# Patient Record
Sex: MALE | Race: WHITE | NOT HISPANIC OR LATINO | Employment: FULL TIME | URBAN - METROPOLITAN AREA
[De-identification: names, ages, dates, MRNs, and addresses within clinical notes are randomized per-mention and may not be internally consistent; named-entity substitution may affect disease eponyms.]

---

## 2024-07-30 ENCOUNTER — OFFICE VISIT (OUTPATIENT)
Dept: FAMILY MEDICINE CLINIC | Facility: CLINIC | Age: 34
End: 2024-07-30
Payer: COMMERCIAL

## 2024-07-30 VITALS
HEART RATE: 85 BPM | HEIGHT: 73 IN | WEIGHT: 315 LBS | OXYGEN SATURATION: 95 % | SYSTOLIC BLOOD PRESSURE: 132 MMHG | BODY MASS INDEX: 41.75 KG/M2 | DIASTOLIC BLOOD PRESSURE: 98 MMHG

## 2024-07-30 DIAGNOSIS — Z00.00 HEALTH CARE MAINTENANCE: ICD-10-CM

## 2024-07-30 DIAGNOSIS — E78.2 MIXED HYPERLIPIDEMIA: Chronic | ICD-10-CM

## 2024-07-30 DIAGNOSIS — Z00.00 ENCOUNTER FOR ANNUAL PHYSICAL EXAM: Primary | ICD-10-CM

## 2024-07-30 DIAGNOSIS — I10 ESSENTIAL HYPERTENSION: Chronic | ICD-10-CM

## 2024-07-30 DIAGNOSIS — E66.01 MORBID (SEVERE) OBESITY DUE TO EXCESS CALORIES: Chronic | ICD-10-CM

## 2024-07-30 DIAGNOSIS — F51.05 INSOMNIA DUE TO OTHER MENTAL DISORDER: Chronic | ICD-10-CM

## 2024-07-30 DIAGNOSIS — F99 INSOMNIA DUE TO OTHER MENTAL DISORDER: Chronic | ICD-10-CM

## 2024-07-30 DIAGNOSIS — F41.0 SEVERE ANXIETY WITH PANIC: Chronic | ICD-10-CM

## 2024-07-30 DIAGNOSIS — K21.9 CHRONIC GERD: Chronic | ICD-10-CM

## 2024-07-30 LAB
ALBUMIN SERPL-MCNC: 4.7 G/DL (ref 3.5–5.2)
ALBUMIN/GLOB SERPL: 2 G/DL
ALP SERPL-CCNC: 57 U/L (ref 39–117)
ALT SERPL-CCNC: 16 U/L (ref 1–41)
AST SERPL-CCNC: 17 U/L (ref 1–40)
BILIRUB SERPL-MCNC: 0.8 MG/DL (ref 0–1.2)
BUN SERPL-MCNC: 12 MG/DL (ref 6–20)
BUN/CREAT SERPL: 12.8 (ref 7–25)
CALCIUM SERPL-MCNC: 9.6 MG/DL (ref 8.6–10.5)
CHLORIDE SERPL-SCNC: 102 MMOL/L (ref 98–107)
CHOLEST SERPL-MCNC: 198 MG/DL (ref 0–200)
CO2 SERPL-SCNC: 25.7 MMOL/L (ref 22–29)
CREAT SERPL-MCNC: 0.94 MG/DL (ref 0.76–1.27)
EGFRCR SERPLBLD CKD-EPI 2021: 108.4 ML/MIN/1.73
GLOBULIN SER CALC-MCNC: 2.4 GM/DL
GLUCOSE SERPL-MCNC: 96 MG/DL (ref 65–99)
HDLC SERPL-MCNC: 43 MG/DL (ref 40–60)
LDLC SERPL CALC-MCNC: 132 MG/DL (ref 0–100)
POTASSIUM SERPL-SCNC: 4.6 MMOL/L (ref 3.5–5.2)
PROT SERPL-MCNC: 7.1 G/DL (ref 6–8.5)
SODIUM SERPL-SCNC: 137 MMOL/L (ref 136–145)
TRIGL SERPL-MCNC: 125 MG/DL (ref 0–150)
VLDLC SERPL CALC-MCNC: 23 MG/DL (ref 5–40)

## 2024-07-30 PROCEDURE — 99395 PREV VISIT EST AGE 18-39: CPT | Performed by: NURSE PRACTITIONER

## 2024-07-30 PROCEDURE — 99214 OFFICE O/P EST MOD 30 MIN: CPT | Performed by: NURSE PRACTITIONER

## 2024-07-30 NOTE — PROGRESS NOTES
Chief Complaint  Annual checkup.     HISTORY    Kenny Man Jr. is a 33 y.o. male who presents to the office today as a  an established patient for their annual preventative exam.      No hospitalization(s) within the last year.     Current exercise regimen: None.    Status of chronic medical conditions:     Hypertension - uncontrolled on Lisinopril 40mg daily and sometimes will occasional miss a dose here and there.  He denies SOB, CP, heart palpitations and syncope.     Hyperlipidemia -  uncontrolled on prior lab from 4/30/24.  Currently taking Rosuvastatin 40mg nightly. He may have only missed taking medication a couple days since 4/2024.  He denies muscle aches.     GERD - stable on OTC Nexium for chronic reflux.  He reports Nexium helps with reflux.  He is followed by GI who encouraged taking Nexium at night due to increased nighttime symptoms.  GI did EGD with finding of acute duodenitis.     Severe Anxiety and Panic Attacks - controlled on Buspar 10mg TID and Escitalopram 10mg daily.  He denies SI/HI.     Insomnia - controlled on Trazodone to help him with anxiety and sleep.     Any other concerns regarding their health today: None.    Review of Systems   Constitutional: Negative.    HENT: Negative.     Eyes: Negative.    Respiratory: Negative.     Cardiovascular: Negative.    Gastrointestinal: Negative.    Endocrine: Negative.    Genitourinary: Negative.    Musculoskeletal: Negative.         Intermittent right ankle pain - hx of fracture and sprain - controlled with OTC medication.   Skin: Negative.    Allergic/Immunologic: Negative.    Neurological: Negative.    Hematological: Negative.    Psychiatric/Behavioral:  Positive for sleep disturbance and depressed mood. Negative for agitation, behavioral problems, decreased concentration, dysphoric mood, hallucinations, self-injury, suicidal ideas, negative for hyperactivity and stress. The patient is nervous/anxious.         Anxiety/painic attacks and  depression controlled with medication; sleep controlled with medication.        Health Maintenance Summary            Overdue - COVID-19 Vaccine (5 - 2023-24 season) Overdue since 9/1/2023 11/06/2021  Imm Admin: COVID-19 (MODERNA) Monovalent Original Booster    02/19/2021  Imm Admin: COVID-19 (MODERNA) 1st,2nd,3rd Dose Monovalent    01/25/2021  Imm Admin: COVID-19 (MODERNA) 1st,2nd,3rd Dose Monovalent    01/06/2021  Imm Admin: COVID-19 (MODERNA) 1st,2nd,3rd Dose Monovalent              Overdue - HEPATITIS C SCREENING (Once) Never done      No completion history exists for this topic.              Overdue - ANNUAL PHYSICAL (Yearly) Never done      No completion history exists for this topic.              INFLUENZA VACCINE (Yearly - August to March) Due since 8/1/2024      No completion history exists for this topic.              BMI FOLLOWUP (Yearly) Next due on 4/30/2025 04/30/2024  [employer]: WORKFLOW - QUALITY MEASUREMENT - DOCUMENTED WEIGHT FOLLOW-UP PLAN    04/30/2024  [employer]: WORKFLOW - QUALITY MEASUREMENT - BMI FOLLOW UP CARE PLAN DOCUMENTED              LIPID PANEL (Yearly) Next due on 7/30/2025 07/30/2024  Lipid Panel    04/30/2024  Lipid Panel    12/14/2022  LIPID PANEL    05/05/2022  LIPID PANEL    11/05/2021  LIPID PANEL    Only the first 5 history entries have been loaded, but more history exists.              TDAP/TD VACCINES (2 - Td or Tdap) Next due on 8/4/2031 08/04/2021  Outside Immunization: Tdap, Adsorbed              Pneumococcal Vaccine 0-64 (Series Information) Aged Out      No completion history exists for this topic.                     Allergies   Allergen Reactions    Nickel Rash        Outpatient Medications Marked as Taking for the 7/30/24 encounter (Office Visit) with Rosio Rondon APRN   Medication Sig Dispense Refill    esomeprazole (nexIUM) 40 MG capsule Take 1 capsule by mouth Every Morning Before Breakfast. 30 capsule 12    traZODone (DESYREL) 100 MG  "tablet Take 0.5-1 tablets by mouth Every Night. 60 tablet 0    [DISCONTINUED] busPIRone (BUSPAR) 10 MG tablet Take 1 tablet by mouth 3 (Three) Times a Day. 270 tablet 0    [DISCONTINUED] escitalopram (LEXAPRO) 10 MG tablet Take 1 tablet by mouth Daily. 90 tablet 0    [DISCONTINUED] lisinopril (PRINIVIL,ZESTRIL) 40 MG tablet Take 1 tablet by mouth Daily. 90 tablet 0    [DISCONTINUED] rosuvastatin (CRESTOR) 40 MG tablet Take 1 tablet by mouth Every Night. 90 tablet 0       Past Medical History:   Diagnosis Date    Anxiety     Hyperlipidemia     Hypertension     Obesity      Past Surgical History:   Procedure Laterality Date    FINGER FRACTURE SURGERY  2019    pinky    FRACTURE SURGERY  2022    Broke and dislocated by right ankle. Complete facture of the fibula and complete dislocation of the tibia.     Family History   Problem Relation Age of Onset    Anxiety disorder Mother     Heart disease Father     Heart disease Paternal Grandfather          of heart disease at age 49    Anxiety disorder Sister     reports that he quit smoking about 2 years ago. His smoking use included cigarettes. He started smoking about 15 years ago. He has a 6.5 pack-year smoking history. He has been exposed to tobacco smoke. He has never used smokeless tobacco. He reports current alcohol use of about 5.0 standard drinks of alcohol per week. He reports that he does not use drugs.    Immunization History   Administered Date(s) Administered    COVID-19 (MODERNA) 1st,2nd,3rd Dose Monovalent 2021, 2021, 2021    COVID-19 (MODERNA) Monovalent Original Booster 2021        OBJECTIVE    Vital Signs:   /98 (BP Location: Right arm, Patient Position: Sitting, Cuff Size: Adult)   Pulse 85   Ht 184.4 cm (72.6\")   Wt (!) 155 kg (341 lb 6.4 oz)   SpO2 95%   BMI 45.54 kg/m²     Physical Exam  Vitals and nursing note reviewed.   Constitutional:       General: He is not in acute distress.     Appearance: Normal " appearance. He is obese. He is not ill-appearing.   HENT:      Head: Normocephalic and atraumatic.      Right Ear: Tympanic membrane, ear canal and external ear normal. There is no impacted cerumen.      Left Ear: Tympanic membrane, ear canal and external ear normal. There is no impacted cerumen.      Nose: Nose normal. No congestion or rhinorrhea.      Mouth/Throat:      Mouth: Mucous membranes are moist.      Pharynx: No oropharyngeal exudate or posterior oropharyngeal erythema.   Eyes:      General: No scleral icterus.        Right eye: No discharge.         Left eye: No discharge.      Extraocular Movements: Extraocular movements intact.      Conjunctiva/sclera: Conjunctivae normal.      Pupils: Pupils are equal, round, and reactive to light.   Neck:      Vascular: No carotid bruit.   Cardiovascular:      Rate and Rhythm: Normal rate and regular rhythm.      Pulses: Normal pulses.      Heart sounds: Normal heart sounds. No murmur heard.     No gallop.   Pulmonary:      Effort: Pulmonary effort is normal. No respiratory distress.      Breath sounds: Normal breath sounds. No wheezing.   Abdominal:      General: Bowel sounds are normal. There is no distension.      Palpations: Abdomen is soft. There is no mass.      Tenderness: There is no abdominal tenderness. There is no right CVA tenderness, left CVA tenderness, guarding or rebound.      Hernia: There is no hernia in the umbilical area or ventral area.   Genitourinary:     Comments: Deferred.  Musculoskeletal:         General: No swelling or tenderness. Normal range of motion.      Cervical back: Normal range of motion and neck supple. No rigidity or tenderness.      Right lower leg: No edema.      Left lower leg: No edema.   Lymphadenopathy:      Cervical: No cervical adenopathy.   Skin:     General: Skin is warm.      Capillary Refill: Capillary refill takes less than 2 seconds.      Findings: No lesion or rash.   Neurological:      General: No focal deficit  "present.      Mental Status: He is alert.      Cranial Nerves: No cranial nerve deficit.      Sensory: No sensory deficit.      Motor: No weakness.      Coordination: Coordination normal.      Gait: Gait normal.   Psychiatric:         Mood and Affect: Mood normal.         Behavior: Behavior normal.          The following data was reviewed by: STEPH Grewal on 07/30/2024:    CBC & Differential (04/30/2024 14:28)  Comprehensive Metabolic Panel (04/30/2024 14:28)  Lipid Panel (04/30/2024 14:28)  Hemoglobin A1c (04/30/2024 14:28)         The ASCVD Risk score (Iker WHYTE, et al., 2019) failed to calculate for the following reasons:    The 2019 ASCVD risk score is only valid for ages 40 to 79           ASSESSMENT & PLAN   Diagnoses and all orders for this visit:    1. Encounter for annual physical exam (Primary)  -     Comprehensive Metabolic Panel  -     Lipid Panel    2. Essential hypertension  Assessment & Plan:  Hypertension is uncontrolled.  Patient \"will occasionally miss a dose here and there.\"  Decrease table salt and foods high in salt.  Weight loss.  Increase activity daily.  Continue Lisinopril 40mg daily.  Discussed importance of taking BP med daily every day.  Blood pressure will be re-assessed in 6 months.      Orders:  -     Comprehensive Metabolic Panel    3. Mixed hyperlipidemia  Assessment & Plan:  Lipid abnormalities are uncontrolled on prior lab.  Patient missed dose a couple days since 4/2024.  Encouraged lifestyle changes.  Continue current treatment plan.  Will re-assess lipids today.      Orders:  -     Lipid Panel    4. Severe anxiety with panic  -     Comprehensive Metabolic Panel    5. Chronic GERD  -     Comprehensive Metabolic Panel    6. Insomnia due to other mental disorder    7. Morbid (severe) obesity due to excess calories  -     Comprehensive Metabolic Panel  -     Lipid Panel    8. Health care maintenance  Comments:  Use sunscreen w/sun expsure.  Working smoke/Carbon Monoxide " detectors in home.  Wear seatbelt.  Annual Vision/Dental exam.  Increase activity 30min/day x 5 days        Annual Preventative Health Examination  -Age and sex appropriate physical exam performed and documented. Updated past medical, family, social and surgical histories as well as allergies and care team list. Addressed care gaps listed in the medical record.  -Encouraged seat belt use for every car ride for patient and all occupants. Encouraged sunscreen use to reduce risk of skin cancer for any days with sun exposure over 20 minutes.  Discussed the importance of smoke and carbon monoxide detectors in the home.   -Encouraged annual dental and vision exams as part of their overall health.  -Encouraged minimum of 30 minutes or more of exercise at a brisk walk or higher 5 days per week combined with a well-balanced diet.   -Immunizations reviewed and updated in EMR. COVID19 recommended.  -Lipid screening:   Lipid Panel          4/30/2024    14:28 7/30/2024    10:00   Lipid Panel   Total Cholesterol 362  198    Triglycerides 140  125    HDL Cholesterol 47  43    VLDL Cholesterol 27  23    LDL Cholesterol  288  132    Patient is less than age 40 and has had a screening lipid panel in the last 4 years that was abnormal.  Will recollect Lipid Panel today.  -Aspirin for primary or secondary prevention: Not applicable, patient is less than age 50.  -Depression and Anxiety screening: Patient has known diagnosis of depression and / or anxiety.  -Diabetes screening: Patient is 35-70 years of age and overweight, screening for diabetes is indicated every 3 years. Screening is up to date.    -Tobacco use screening: Patient denies cigarette use. Tobacco counseling was was not indicated.  -Alcohol use screening: Patient reports drinking 5 drinks per week.. Alcohol abuse counseling was was declined by patient.  -Illicit drug screening: Patient does not use illicit drugs.  -Abdominal aortic aneurysm screening: AAA screening is not  indicated as patient is less than 65 years of age.  -Hypertension screening: Patient with known diagnosis of hypertension and is receiving treatment.  -HIV screening: Screening not indicated, not in a high-risk group.  -Hepatitis C virus screening:  Screening not indicated, not in a high-risk group.  -Syphilis screening: Syphilis screening not indicated.  -Hepatitis B virus screening: Screening not indicated, not in a high-risk group.  -Colon cancer screening: Patient is less than age 45 and colon cancer screening is not indicated.  -Lung cancer screening: Patient has smoked but does not meet other eligibility criteria for screening.  -Prostate cancer screening: Not applicable, patient is less than 50 years old.      Follow up in 1 year for annual physical exam.    Patient/family had no further questions at this time and verbalized understanding of the plan discussed today.     This document has been electronically signed by STEPH Grewal  August 15, 2024 22:23 EDT

## 2024-07-31 DIAGNOSIS — F41.0 SEVERE ANXIETY WITH PANIC: ICD-10-CM

## 2024-07-31 DIAGNOSIS — E78.2 MIXED HYPERLIPIDEMIA: ICD-10-CM

## 2024-07-31 DIAGNOSIS — I10 ESSENTIAL HYPERTENSION: ICD-10-CM

## 2024-07-31 RX ORDER — ESCITALOPRAM OXALATE 10 MG/1
10 TABLET ORAL DAILY
Qty: 90 TABLET | Refills: 0 | Status: SHIPPED | OUTPATIENT
Start: 2024-07-31

## 2024-07-31 RX ORDER — ROSUVASTATIN CALCIUM 40 MG/1
40 TABLET, COATED ORAL
Qty: 90 TABLET | Refills: 0 | Status: SHIPPED | OUTPATIENT
Start: 2024-07-31

## 2024-07-31 RX ORDER — BUSPIRONE HYDROCHLORIDE 10 MG/1
10 TABLET ORAL 3 TIMES DAILY
Qty: 270 TABLET | Refills: 0 | Status: SHIPPED | OUTPATIENT
Start: 2024-07-31

## 2024-07-31 RX ORDER — LISINOPRIL 40 MG/1
40 TABLET ORAL DAILY
Qty: 90 TABLET | Refills: 0 | Status: SHIPPED | OUTPATIENT
Start: 2024-07-31

## 2024-08-12 ENCOUNTER — HOSPITAL ENCOUNTER (EMERGENCY)
Facility: HOSPITAL | Age: 34
Discharge: HOME | End: 2024-08-12
Attending: EMERGENCY MEDICINE | Admitting: EMERGENCY MEDICINE
Payer: COMMERCIAL

## 2024-08-12 ENCOUNTER — APPOINTMENT (OUTPATIENT)
Dept: GENERAL RADIOLOGY | Facility: HOSPITAL | Age: 34
End: 2024-08-12
Payer: COMMERCIAL

## 2024-08-12 VITALS
OXYGEN SATURATION: 96 % | SYSTOLIC BLOOD PRESSURE: 129 MMHG | TEMPERATURE: 98.8 F | RESPIRATION RATE: 16 BRPM | HEART RATE: 110 BPM | DIASTOLIC BLOOD PRESSURE: 81 MMHG

## 2024-08-12 DIAGNOSIS — S29.019A ACUTE THORACIC MYOFASCIAL STRAIN, INITIAL ENCOUNTER: Primary | ICD-10-CM

## 2024-08-12 LAB
ALBUMIN SERPL-MCNC: 4.2 G/DL (ref 3.5–5.2)
ALBUMIN/GLOB SERPL: 2 G/DL
ALP SERPL-CCNC: 54 U/L (ref 39–117)
ALT SERPL W P-5'-P-CCNC: 11 U/L (ref 1–41)
ANION GAP SERPL CALCULATED.3IONS-SCNC: 8.5 MMOL/L (ref 5–15)
AST SERPL-CCNC: 11 U/L (ref 1–40)
BASOPHILS # BLD AUTO: 0.03 10*3/MM3 (ref 0–0.2)
BASOPHILS NFR BLD AUTO: 0.5 % (ref 0–1.5)
BILIRUB SERPL-MCNC: 0.7 MG/DL (ref 0–1.2)
BUN SERPL-MCNC: 11 MG/DL (ref 6–20)
BUN/CREAT SERPL: 14.5 (ref 7–25)
CALCIUM SPEC-SCNC: 9 MG/DL (ref 8.6–10.5)
CHLORIDE SERPL-SCNC: 105 MMOL/L (ref 98–107)
CO2 SERPL-SCNC: 23.5 MMOL/L (ref 22–29)
CREAT SERPL-MCNC: 0.76 MG/DL (ref 0.76–1.27)
DEPRECATED RDW RBC AUTO: 40.6 FL (ref 37–54)
EGFRCR SERPLBLD CKD-EPI 2021: 120.2 ML/MIN/1.73
EOSINOPHIL # BLD AUTO: 0.18 10*3/MM3 (ref 0–0.4)
EOSINOPHIL NFR BLD AUTO: 3 % (ref 0.3–6.2)
ERYTHROCYTE [DISTWIDTH] IN BLOOD BY AUTOMATED COUNT: 11.7 % (ref 12.3–15.4)
GLOBULIN UR ELPH-MCNC: 2.1 GM/DL
GLUCOSE SERPL-MCNC: 128 MG/DL (ref 65–99)
HCT VFR BLD AUTO: 40.2 % (ref 37.5–51)
HGB BLD-MCNC: 13.8 G/DL (ref 13–17.7)
HOLD SPECIMEN: NORMAL
HOLD SPECIMEN: NORMAL
IMM GRANULOCYTES # BLD AUTO: 0.02 10*3/MM3 (ref 0–0.05)
IMM GRANULOCYTES NFR BLD AUTO: 0.3 % (ref 0–0.5)
LYMPHOCYTES # BLD AUTO: 1.63 10*3/MM3 (ref 0.7–3.1)
LYMPHOCYTES NFR BLD AUTO: 27.2 % (ref 19.6–45.3)
MCH RBC QN AUTO: 32.7 PG (ref 26.6–33)
MCHC RBC AUTO-ENTMCNC: 34.3 G/DL (ref 31.5–35.7)
MCV RBC AUTO: 95.3 FL (ref 79–97)
MONOCYTES # BLD AUTO: 0.37 10*3/MM3 (ref 0.1–0.9)
MONOCYTES NFR BLD AUTO: 6.2 % (ref 5–12)
NEUTROPHILS NFR BLD AUTO: 3.77 10*3/MM3 (ref 1.7–7)
NEUTROPHILS NFR BLD AUTO: 62.8 % (ref 42.7–76)
NRBC BLD AUTO-RTO: 0 /100 WBC (ref 0–0.2)
PLATELET # BLD AUTO: 196 10*3/MM3 (ref 140–450)
PMV BLD AUTO: 10.5 FL (ref 6–12)
POTASSIUM SERPL-SCNC: 4.2 MMOL/L (ref 3.5–5.2)
PROT SERPL-MCNC: 6.3 G/DL (ref 6–8.5)
QT INTERVAL: 355 MS
QTC INTERVAL: 412 MS
RBC # BLD AUTO: 4.22 10*6/MM3 (ref 4.14–5.8)
SODIUM SERPL-SCNC: 137 MMOL/L (ref 136–145)
TROPONIN T SERPL HS-MCNC: <6 NG/L
WBC NRBC COR # BLD AUTO: 6 10*3/MM3 (ref 3.4–10.8)
WHOLE BLOOD HOLD COAG: NORMAL
WHOLE BLOOD HOLD SPECIMEN: NORMAL

## 2024-08-12 PROCEDURE — 93005 ELECTROCARDIOGRAM TRACING: CPT | Performed by: EMERGENCY MEDICINE

## 2024-08-12 PROCEDURE — 93010 ELECTROCARDIOGRAM REPORT: CPT | Performed by: INTERNAL MEDICINE

## 2024-08-12 PROCEDURE — 93005 ELECTROCARDIOGRAM TRACING: CPT

## 2024-08-12 PROCEDURE — 84484 ASSAY OF TROPONIN QUANT: CPT

## 2024-08-12 PROCEDURE — 99284 EMERGENCY DEPT VISIT MOD MDM: CPT

## 2024-08-12 PROCEDURE — 85025 COMPLETE CBC W/AUTO DIFF WBC: CPT

## 2024-08-12 PROCEDURE — 36415 COLL VENOUS BLD VENIPUNCTURE: CPT

## 2024-08-12 PROCEDURE — 80053 COMPREHEN METABOLIC PANEL: CPT

## 2024-08-12 PROCEDURE — 71045 X-RAY EXAM CHEST 1 VIEW: CPT

## 2024-08-12 RX ORDER — ASPIRIN 325 MG
325 TABLET ORAL ONCE
Status: DISCONTINUED | OUTPATIENT
Start: 2024-08-12 | End: 2024-08-13 | Stop reason: HOSPADM

## 2024-08-12 RX ORDER — HYDROCODONE BITARTRATE AND ACETAMINOPHEN 7.5; 325 MG/1; MG/1
1 TABLET ORAL ONCE
Status: COMPLETED | OUTPATIENT
Start: 2024-08-12 | End: 2024-08-12

## 2024-08-12 RX ORDER — SODIUM CHLORIDE 0.9 % (FLUSH) 0.9 %
10 SYRINGE (ML) INJECTION AS NEEDED
Status: DISCONTINUED | OUTPATIENT
Start: 2024-08-12 | End: 2024-08-13 | Stop reason: HOSPADM

## 2024-08-12 RX ORDER — METHOCARBAMOL 750 MG/1
1500 TABLET, FILM COATED ORAL ONCE
Status: COMPLETED | OUTPATIENT
Start: 2024-08-12 | End: 2024-08-12

## 2024-08-12 RX ORDER — HYDROCODONE BITARTRATE AND ACETAMINOPHEN 5; 325 MG/1; MG/1
1 TABLET ORAL EVERY 6 HOURS PRN
Qty: 12 TABLET | Refills: 0 | Status: SHIPPED | OUTPATIENT
Start: 2024-08-12

## 2024-08-12 RX ORDER — METHOCARBAMOL 500 MG/1
1500 TABLET, FILM COATED ORAL 3 TIMES DAILY
Qty: 27 TABLET | Refills: 0 | Status: SHIPPED | OUTPATIENT
Start: 2024-08-12

## 2024-08-12 RX ADMIN — METHOCARBAMOL TABLETS 1500 MG: 750 TABLET, COATED ORAL at 20:14

## 2024-08-12 RX ADMIN — HYDROCODONE BITARTRATE AND ACETAMINOPHEN 1 TABLET: 7.5; 325 TABLET ORAL at 20:13

## 2024-08-12 NOTE — ED PROVIDER NOTES
" EMERGENCY DEPARTMENT ENCOUNTER    Room Number:  HB2/D  PCP: Rosio Rondon APRN  Historian: Patient    I initially evaluated the patient at 1954    HPI:  Chief Complaint: Left upper back pain  A complete HPI/ROS/PMH/PSH/SH/FH are unobtainable due to: Nothing  Context: Kenny Man Jr. is a 33 y.o. male with a medical history of hypertension, hyperlipidemia, morbid obesity who presents to the ED c/o acute left upper back pain.  Patient noticed some pain in his left upper back when he woke up yesterday morning.  He says it \"felt like there was a knot there\".  When he woke up this morning, pain was somewhat worse and was radiating into his left neck, left shoulder, and left upper arm.  He reports intermittent tingling in his left upper arm.  Pain is worse with movement.  Denies headache, chest pain, shortness of breath, nausea, vomiting, or sweating.  He took a muscle relaxer with minimal relief.  He went to urgent care earlier today and was sent to the ED for further evaluation.  He denies history of neck or back problems or previous spine surgery.  He works as a  but does not recall any recent injury.            PAST MEDICAL HISTORY  Active Ambulatory Problems     Diagnosis Date Noted    Severe anxiety with panic 07/07/2021    Vitamin D deficiency 08/04/2021    Mixed hyperlipidemia 07/21/2021    Chronic GERD 07/07/2021    Essential hypertension 07/07/2021    Family history of diabetes mellitus in sister 04/30/2024    BMI 40.0-44.9, adult 04/30/2024    Insomnia due to other mental disorder 05/04/2024     Resolved Ambulatory Problems     Diagnosis Date Noted    Generalized anxiety disorder with panic attacks 04/30/2024     Past Medical History:   Diagnosis Date    Anxiety 07/21    Hyperlipidemia     Hypertension     Obesity          PAST SURGICAL HISTORY  Past Surgical History:   Procedure Laterality Date    FINGER FRACTURE SURGERY  2019    pinky    FRACTURE SURGERY  01/28/2022    Broke and " dislocated by right ankle. Complete facture of the fibula and complete dislocation of the tibia.         FAMILY HISTORY  Family History   Problem Relation Age of Onset    Anxiety disorder Mother     Heart disease Father     Heart disease Paternal Grandfather          of heart disease at age 49    Anxiety disorder Sister          SOCIAL HISTORY  Social History     Socioeconomic History    Marital status: Single   Tobacco Use    Smoking status: Former     Current packs/day: 0.00     Average packs/day: 0.5 packs/day for 13.0 years (6.5 ttl pk-yrs)     Types: Cigarettes     Start date: 2009     Quit date: 2022     Years since quittin.1     Passive exposure: Past    Smokeless tobacco: Never    Tobacco comments:     I was a smoker. Never got above half a pack per day. I use tobacco free nicotine vapes.   Vaping Use    Vaping status: Every Day    Substances: Nicotine, Flavoring    Devices: Disposable   Substance and Sexual Activity    Alcohol use: Yes     Alcohol/week: 5.0 standard drinks of alcohol     Types: 5 Standard drinks or equivalent per week    Drug use: Never    Sexual activity: Not Currently     Partners: Female     Birth control/protection: Condom         ALLERGIES  Nickel    REVIEW OF SYSTEMS  Review of Systems  Included in HPI  All systems reviewed and negative except for those discussed in HPI.      PHYSICAL EXAM  ED Triage Vitals   Temp Heart Rate Resp BP SpO2   24 1650 24 1650 24 1650 24 1659 24 1650   98.8 °F (37.1 °C) 110 16 129/81 96 %      Temp src Heart Rate Source Patient Position BP Location FiO2 (%)   -- -- 24 1659 24 1659 --     Sitting Right arm        Physical Exam      GENERAL: Awake, alert, oriented x 3.  Well-developed male.  Resting comfortably in no acute distress.  BMI 45.5  HENT: NCAT, nares patent  EYES: no scleral icterus  CV: regular rhythm, normal rate, equal bilateral radial pulses  RESPIRATORY: normal effort, clear to  auscultation bilaterally  ABDOMEN: soft, nontender  MUSCULOSKELETAL: C/T/L-spine are nontender.  There is soft tissue tenderness over the left upper thoracic paraspinal muscles and left trapezius muscle.  Extremities are nontender with full range of motion  NEURO: Speech is normal.  No facial droop.  Normal strength and light touch sensation in all extremities  PSYCH:  calm, cooperative  SKIN: warm, dry    Vital signs and nursing notes reviewed.          LAB RESULTS  Recent Results (from the past 24 hour(s))   ECG 12 Lead ED Triage Standing Order; Chest Pain    Collection Time: 08/12/24  4:52 PM   Result Value Ref Range    QT Interval 355 ms    QTC Interval 412 ms   Comprehensive Metabolic Panel    Collection Time: 08/12/24  4:59 PM    Specimen: Blood   Result Value Ref Range    Glucose 128 (H) 65 - 99 mg/dL    BUN 11 6 - 20 mg/dL    Creatinine 0.76 0.76 - 1.27 mg/dL    Sodium 137 136 - 145 mmol/L    Potassium 4.2 3.5 - 5.2 mmol/L    Chloride 105 98 - 107 mmol/L    CO2 23.5 22.0 - 29.0 mmol/L    Calcium 9.0 8.6 - 10.5 mg/dL    Total Protein 6.3 6.0 - 8.5 g/dL    Albumin 4.2 3.5 - 5.2 g/dL    ALT (SGPT) 11 1 - 41 U/L    AST (SGOT) 11 1 - 40 U/L    Alkaline Phosphatase 54 39 - 117 U/L    Total Bilirubin 0.7 0.0 - 1.2 mg/dL    Globulin 2.1 gm/dL    A/G Ratio 2.0 g/dL    BUN/Creatinine Ratio 14.5 7.0 - 25.0    Anion Gap 8.5 5.0 - 15.0 mmol/L    eGFR 120.2 >60.0 mL/min/1.73   High Sensitivity Troponin T    Collection Time: 08/12/24  4:59 PM    Specimen: Blood   Result Value Ref Range    HS Troponin T <6 <22 ng/L   Green Top (Gel)    Collection Time: 08/12/24  4:59 PM   Result Value Ref Range    Extra Tube Hold for add-ons.    Lavender Top    Collection Time: 08/12/24  4:59 PM   Result Value Ref Range    Extra Tube hold for add-on    Gold Top - SST    Collection Time: 08/12/24  4:59 PM   Result Value Ref Range    Extra Tube Hold for add-ons.    Light Blue Top    Collection Time: 08/12/24  4:59 PM   Result Value Ref Range     Extra Tube Hold for add-ons.    CBC Auto Differential    Collection Time: 08/12/24  4:59 PM    Specimen: Blood   Result Value Ref Range    WBC 6.00 3.40 - 10.80 10*3/mm3    RBC 4.22 4.14 - 5.80 10*6/mm3    Hemoglobin 13.8 13.0 - 17.7 g/dL    Hematocrit 40.2 37.5 - 51.0 %    MCV 95.3 79.0 - 97.0 fL    MCH 32.7 26.6 - 33.0 pg    MCHC 34.3 31.5 - 35.7 g/dL    RDW 11.7 (L) 12.3 - 15.4 %    RDW-SD 40.6 37.0 - 54.0 fl    MPV 10.5 6.0 - 12.0 fL    Platelets 196 140 - 450 10*3/mm3    Neutrophil % 62.8 42.7 - 76.0 %    Lymphocyte % 27.2 19.6 - 45.3 %    Monocyte % 6.2 5.0 - 12.0 %    Eosinophil % 3.0 0.3 - 6.2 %    Basophil % 0.5 0.0 - 1.5 %    Immature Grans % 0.3 0.0 - 0.5 %    Neutrophils, Absolute 3.77 1.70 - 7.00 10*3/mm3    Lymphocytes, Absolute 1.63 0.70 - 3.10 10*3/mm3    Monocytes, Absolute 0.37 0.10 - 0.90 10*3/mm3    Eosinophils, Absolute 0.18 0.00 - 0.40 10*3/mm3    Basophils, Absolute 0.03 0.00 - 0.20 10*3/mm3    Immature Grans, Absolute 0.02 0.00 - 0.05 10*3/mm3    nRBC 0.0 0.0 - 0.2 /100 WBC       Ordered the above labs and reviewed the results.        RADIOLOGY  XR Chest 1 View    Result Date: 8/12/2024  XR CHEST 1 VW-  HISTORY: 33-year-old male with anxiety. Pain in neck radiating down left arm.  FINDINGS: There is no evidence for pneumonia, effusions, or CHF. No pneumothorax.  This report was finalized on 8/12/2024 5:17 PM by Dr. Letty Rand M.D on Workstation: [id number]       Ordered the above noted radiological studies. Reviewed by me in PACS.            PROCEDURES  Procedures      OUTPATIENT MEDICATION MANAGEMENT:  Current Facility-Administered Medications Ordered in Epic   Medication Dose Route Frequency Provider Last Rate Last Admin    aspirin tablet 325 mg  325 mg Oral Once Oliver Blank MD        sodium chloride 0.9 % flush 10 mL  10 mL Intravenous PRN Oliver Blank MD         Current Outpatient Medications Ordered in Epic   Medication Sig Dispense Refill    busPIRone (BUSPAR) 10  MG tablet TAKE 1 TABLET BY MOUTH THREE TIMES A  tablet 0    escitalopram (LEXAPRO) 10 MG tablet TAKE 1 TABLET BY MOUTH EVERY DAY 90 tablet 0    esomeprazole (nexIUM) 40 MG capsule Take 1 capsule by mouth Every Morning Before Breakfast. 30 capsule 12    HYDROcodone-acetaminophen (NORCO) 5-325 MG per tablet Take 1 tablet by mouth Every 6 (Six) Hours As Needed for Moderate Pain. 12 tablet 0    lisinopril (PRINIVIL,ZESTRIL) 40 MG tablet TAKE 1 TABLET BY MOUTH EVERY DAY 90 tablet 0    methocarbamol (ROBAXIN) 500 MG tablet Take 3 tablets by mouth 3 (Three) Times a Day. 27 tablet 0    rosuvastatin (CRESTOR) 40 MG tablet TAKE 1 TABLET BY MOUTH EVERY DAY AT NIGHT 90 tablet 0    traZODone (DESYREL) 100 MG tablet Take 0.5-1 tablets by mouth Every Night. 60 tablet 0           MEDICATIONS GIVEN IN ER  Medications   sodium chloride 0.9 % flush 10 mL (has no administration in time range)   aspirin tablet 325 mg (has no administration in time range)   HYDROcodone-acetaminophen (NORCO) 7.5-325 MG per tablet 1 tablet (1 tablet Oral Given 8/12/24 2013)   methocarbamol (ROBAXIN) tablet 1,500 mg (1,500 mg Oral Given 8/12/24 2014)                   MEDICAL DECISION MAKING, PROGRESS, and CONSULTS    All labs have been independently reviewed by me.  All radiology studies have been reviewed by me and I have also reviewed the radiology report.   EKG's independently viewed and interpreted by me.  Discussion below represents my analysis of pertinent findings related to patient's condition, differential diagnosis, treatment plan and final disposition.      Additional sources:    - Discussed/ obtained information from independent historians: None    - External (non-ED) record review: Patient was seen at urgent care earlier today for neck and back pain.  He was sent to the ED for further evaluation.  Patient does not have any prior ED visits or admissions here.  He saw his PCP on 7/30/2024 for follow-up for his annual exam.  He has a  history of anxiety, hyperlipidemia, hypertension, GERD, and morbid obesity.    -Prescription drug monitoring program review: KATHERINE reviewed by Oliver Blank MD KASPER query complete and reviewed. Treatment plan to include limited course of prescribed controlled substance. Risks including addiction, benefits, and alternatives presented to patient.    - Chronic or social conditions impacting patient care (Social Determinants of Health): None          Orders placed during this visit:  Orders Placed This Encounter   Procedures    XR Chest 1 View    [place] Draw    Comprehensive Metabolic Panel    High Sensitivity Troponin T    CBC Auto Differential    NPO Diet NPO Type: Strict NPO    Undress & Gown    Continuous Pulse Oximetry    Oxygen Therapy- Nasal Cannula; Titrate 1-6 LPM Per SpO2; 90 - 95%    ECG 12 Lead ED Triage Standing Order; Chest Pain    ECG 12 Lead ED Triage Standing Order; Chest Pain    Insert Peripheral IV    CBC & Differential    Green Top (Gel)    Lavender Top    Gold Top - SST    Light Blue Top         Additional orders considered but not ordered:          Differential diagnosis includes, but is not limited to:    Muscle strain, muscle spasm, radiculopathy      Independent interpretation of labs, radiology studies, and discussions with consultants:  ED Course as of 08/12/24 2332   Mon Aug 12, 2024   1954 HS Troponin T: <6 [WH]   1954 WBC: 6.00 [WH]   1954 Hemoglobin: 13.8 [WH]   1954 Glucose(!): 128 [WH]   1954 Creatinine: 0.76 []   1954 Chest x-ray personally interpreted by me.  My personal interpretation is: Heart size is normal.  Lungs are clear.  No pleural effusion. []   1955 EKG personally interpreted by me.  My personal interpretation is:          EKG time: 1652  Rhythm/Rate: Sinus rhythm, rate 81  P waves and AL: Normal  QRS, axis: Normal  ST and T waves: Normal    Interpreted Contemporaneously by me, independently viewed  No prior available for comparison   []   2006 Patient  presents to the ED complaining of pain in his left upper back that radiates into the left posterior neck and left shoulder/upper arm.  He does not have any neurological deficits.  EKG, chest x-ray, and labs are unremarkable.  His pain seems to be musculoskeletal.  He will be given a Norco and Robaxin and then reassessed. [WH]   2157 Patient is feeling better after Robaxin and Norco.  Test results and diagnoses were discussed with him.  He will be discharged with prescriptions for Robaxin and a short course of pain medication.  Return precautions were discussed. [WH]   2207 MDM: Patient presented to the ED complaining of pain in his left upper back.  Denied chest pain, shortness of breath, or focal weakness.  Workup was unremarkable.  Pain is most likely musculoskeletal.  Plan is for symptomatic treatment. [WH]      ED Course User Index  [WH] Oliver Blank MD         COMPLEXITY OF CARE        DIAGNOSIS  Final diagnoses:   Acute thoracic myofascial strain, initial encounter         DISPOSITION  DISCHARGE    Patient discharged in stable condition.    Reviewed implications of results, diagnosis, meds, responsibility to follow up, warning signs and symptoms of possible worsening, potential complications and reasons to return to ER, including worsening/persistent pain, chest pain, shortness of breath, arm weakness, or other concern.    Patient/Family voiced understanding of above instructions.    Discussed plan for discharge, as there is no emergent indication for admission. Patient referred to primary care provider for BP management due to today's BP. Pt/family is agreeable and understands need for follow up and repeat testing.  Pt is aware that discharge does not mean that nothing is wrong but it indicates no emergency is present that requires admission and they must continue care with follow-up as given below or physician of their choice.     FOLLOW-UP  Rosio Rondon, APRN  [address] [facility]  [id number] [phone]    Schedule an appointment as soon as possible for a visit   If symptoms persist         Medication List        New Prescriptions      HYDROcodone-acetaminophen 5-325 MG per tablet  Commonly known as: NORCO  Take 1 tablet by mouth Every 6 (Six) Hours As Needed for Moderate Pain.     methocarbamol 500 MG tablet  Commonly known as: ROBAXIN  Take 3 tablets by mouth 3 (Three) Times a Day.               Where to Get Your Medications        These medications were sent to [facility]/pharmacy #[id number] - [place], [place] - [address] - [phone]  - [phone]   [address], [facility] [address]      Phone: [phone]   HYDROcodone-acetaminophen 5-325 MG per tablet  methocarbamol 500 MG tablet                   Latest Documented Vital Signs:  AS OF 23:32 EDT VITALS:    BP - 129/81  HR - 110  TEMP - 98.8 °F (37.1 °C)  O2 SATS - 96%    KATHERINE reviewed by Oliver Blank MD       --    Please note that portions of this were completed with a voice recognition program.       Note Disclaimer: At [facility], we believe that sharing information builds trust and better relationships. You are receiving this note because you are receiving care at [facility] or recently visited. It is possible you will see health information before a provider has talked with you about it. This kind of information can be easy to misunderstand. To help you fully understand what it means for your health, we urge you to discuss this note with your provider.             Oliver Blank MD  08/12/24 2297

## 2024-08-12 NOTE — Clinical Note
[facility] EMERGENCY DEPARTMENT  [address] [facility] [address]  Phone: [phone]    Kenny Man was seen and treated in our emergency department on 8/12/2024.  He may return to work on 08/14/2024.         Thank you for choosing [facility].    Oliver Blank MD

## 2024-08-13 ENCOUNTER — TELEPHONE (OUTPATIENT)
Dept: GASTROENTEROLOGY | Facility: CLINIC | Age: 34
End: 2024-08-13
Payer: COMMERCIAL

## 2024-08-13 NOTE — TELEPHONE ENCOUNTER
----- Message from [facility] sent at 8/12/2024  5:36 PM EDT -----  Regarding: Appointment Request  Contact: [phone]  Appointment Request From: Kenny Man Jr.    With Provider: Daxa Owens [[facility] GASTROENTEROLOGY]    Preferred Date Range: 9/19/2024 – 10/7/2024    Preferred Times: Any Time    Reason for visit: Follow-up    Comments:  Follow up after egd

## 2024-08-13 NOTE — DISCHARGE INSTRUCTIONS
Take medications as prescribed.  Apply heat to affected area as needed.  Follow-up with your primary care provider if symptoms do not improve in the next few days.  Return to the emergency department for worsening symptoms, chest pain, shortness of breath, weakness in arms/legs, or other concern.

## 2024-08-16 NOTE — ASSESSMENT & PLAN NOTE
"Hypertension is uncontrolled.  Patient \"will occasionally miss a dose here and there.\"  Decrease table salt and foods high in salt.  Weight loss.  Increase activity daily.  Continue Lisinopril 40mg daily.  Discussed importance of taking BP med daily every day.  Blood pressure will be re-assessed in 6 months.    "

## 2024-08-16 NOTE — ASSESSMENT & PLAN NOTE
Lipid abnormalities are uncontrolled on prior lab.  Patient missed dose a couple days since 4/2024.  Encouraged lifestyle changes.  Continue current treatment plan.  Will re-assess lipids today.

## 2024-09-27 ENCOUNTER — OFFICE VISIT (OUTPATIENT)
Dept: GASTROENTEROLOGY | Facility: CLINIC | Age: 34
End: 2024-09-27
Payer: COMMERCIAL

## 2024-09-27 VITALS
TEMPERATURE: 98.8 F | OXYGEN SATURATION: 94 % | SYSTOLIC BLOOD PRESSURE: 139 MMHG | WEIGHT: 315 LBS | HEIGHT: 73 IN | BODY MASS INDEX: 41.75 KG/M2 | HEART RATE: 77 BPM | DIASTOLIC BLOOD PRESSURE: 83 MMHG

## 2024-09-27 DIAGNOSIS — K21.9 GASTROESOPHAGEAL REFLUX DISEASE WITHOUT ESOPHAGITIS: Primary | ICD-10-CM

## 2024-09-27 RX ORDER — PANTOPRAZOLE SODIUM 40 MG/1
40 TABLET, DELAYED RELEASE ORAL DAILY
Qty: 90 TABLET | Refills: 3 | Status: SHIPPED | OUTPATIENT
Start: 2024-09-27

## 2024-10-17 DIAGNOSIS — F41.0 SEVERE ANXIETY WITH PANIC: ICD-10-CM

## 2024-10-17 RX ORDER — TRAZODONE HYDROCHLORIDE 100 MG/1
50-100 TABLET ORAL NIGHTLY
Qty: 90 TABLET | Refills: 0 | Status: SHIPPED | OUTPATIENT
Start: 2024-10-17

## 2024-10-18 DIAGNOSIS — F99 INSOMNIA DUE TO OTHER MENTAL DISORDER: ICD-10-CM

## 2024-10-18 DIAGNOSIS — F51.05 INSOMNIA DUE TO OTHER MENTAL DISORDER: ICD-10-CM

## 2024-10-18 DIAGNOSIS — F41.0 SEVERE ANXIETY WITH PANIC: Primary | ICD-10-CM

## 2024-12-06 ENCOUNTER — OFFICE VISIT (OUTPATIENT)
Age: 34
End: 2024-12-06
Payer: COMMERCIAL

## 2024-12-06 VITALS
HEART RATE: 97 BPM | DIASTOLIC BLOOD PRESSURE: 85 MMHG | HEIGHT: 73 IN | BODY MASS INDEX: 41.35 KG/M2 | WEIGHT: 312 LBS | OXYGEN SATURATION: 98 % | SYSTOLIC BLOOD PRESSURE: 131 MMHG

## 2024-12-06 DIAGNOSIS — F31.9 BIPOLAR I DISORDER WITH MIXED FEATURES: Primary | ICD-10-CM

## 2024-12-06 RX ORDER — OLANZAPINE 5 MG/1
5 TABLET ORAL NIGHTLY
Qty: 30 TABLET | Refills: 2 | Status: SHIPPED | OUTPATIENT
Start: 2024-12-06 | End: 2025-12-06

## 2024-12-06 RX ORDER — OLANZAPINE 5 MG/1
5 TABLET ORAL NIGHTLY
Qty: 30 TABLET | Refills: 2 | Status: SHIPPED | OUTPATIENT
Start: 2024-12-06 | End: 2024-12-06
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
## 2024-12-13 ENCOUNTER — OFFICE VISIT (OUTPATIENT)
Age: 34
End: 2024-12-13
Payer: COMMERCIAL

## 2024-12-13 VITALS
WEIGHT: 310 LBS | BODY MASS INDEX: 41.08 KG/M2 | SYSTOLIC BLOOD PRESSURE: 126 MMHG | HEIGHT: 73 IN | DIASTOLIC BLOOD PRESSURE: 84 MMHG | OXYGEN SATURATION: 98 % | HEART RATE: 102 BPM

## 2024-12-13 DIAGNOSIS — F41.9 ANXIETY: ICD-10-CM

## 2024-12-13 DIAGNOSIS — F31.9 BIPOLAR I DISORDER WITH MIXED FEATURES: Primary | ICD-10-CM

## 2024-12-13 RX ORDER — HYDROXYZINE HYDROCHLORIDE 25 MG/1
25 TABLET, FILM COATED ORAL 3 TIMES DAILY PRN
Qty: 90 TABLET | Refills: 0 | Status: SHIPPED | OUTPATIENT
Start: 2024-12-13

## 2024-12-13 RX ORDER — OLANZAPINE 10 MG/1
10 TABLET ORAL NIGHTLY
Qty: 30 TABLET | Refills: 0 | Status: SHIPPED | OUTPATIENT
Start: 2024-12-13 | End: 2025-01-12
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
## 2024-12-27 ENCOUNTER — OFFICE VISIT (OUTPATIENT)
Age: 34
End: 2024-12-27
Payer: COMMERCIAL

## 2024-12-27 VITALS
SYSTOLIC BLOOD PRESSURE: 138 MMHG | DIASTOLIC BLOOD PRESSURE: 87 MMHG | WEIGHT: 315 LBS | OXYGEN SATURATION: 96 % | HEIGHT: 73 IN | BODY MASS INDEX: 41.75 KG/M2 | HEART RATE: 89 BPM

## 2024-12-27 DIAGNOSIS — F40.10 SOCIAL ANXIETY DISORDER: ICD-10-CM

## 2024-12-27 DIAGNOSIS — G47.00 INSOMNIA, UNSPECIFIED TYPE: ICD-10-CM

## 2024-12-27 DIAGNOSIS — F41.0 SEVERE ANXIETY WITH PANIC: Primary | ICD-10-CM

## 2024-12-27 RX ORDER — VENLAFAXINE HYDROCHLORIDE 37.5 MG/1
37.5 CAPSULE, EXTENDED RELEASE ORAL DAILY
Qty: 30 CAPSULE | Refills: 0 | Status: SHIPPED | OUTPATIENT
Start: 2024-12-27 | End: 2025-01-26

## 2024-12-27 NOTE — PROGRESS NOTES
"     Office  Follow Up Visit      Patient Name: Kenny Man Jr.  : 1990   MRN: [id number]     Referring Provider: Rosio Rondon APRN    Chief Complaint:  \"management of anxiety and insomnia\"    ICD-10-CM ICD-9-CM   1. Severe anxiety with panic  F41.0 300.01   2. Social anxiety disorder  F40.10 300.23   3. Insomnia, unspecified type  G47.00 780.52        History of Present Illness:   Kenny Man Jr. is a 34 y.o. male presenting for a routine follow up appointment for psychiatric medication management. Patient reports no improvement in sleep since medication increase to 10 mg olanzapine. He experiences persistent daytime somnolence and cognitive fog. He reports significant weight gain of 19 pounds over 3 weeks with increased appetite. Previously used trazodone with benefit, but supply was reduced by new PCP, leading to rationing and decreased efficacy.    Patient notes slight improvement in anxiety, no longer experiencing daily anxiety symptoms. His mood remains unchanged. He reports increased frequency of visual hallucinations, primarily manifesting as shadows and peripheral visual disturbances. He denies auditory hallucinations.    Patient describes chronic insomnia, typically achieving 4-6 hours of sleep nightly, with subsequent daytime fatigue and cognitive impairment affecting work performance. He reports history of hypervigilance and increased hallucinations during a period of occupational stress with a difficult supervisor, which have since decreased after changing work environments.    Patient identifies sleep disturbance as the most burdensome symptom, followed by anxiety which can lead to abdominal cramping when prolonged. He describes difficulty initiating sleep without sleep aids due to intrusive thoughts. He reports symptoms consistent with social anxiety, particularly in crowded or loud environments like bars.    We discussed potential bipolar symptoms during this appointment and " he denies periods of elevated mood or decreased need for sleep consistent with dylon. He reports visual disturbances primarily as shadows and movement in peripheral vision, which have historically occurred with increased stress. He describes a past period of constant anxiety lasting 2.5 years due to work stress. Although he reports insomnia, he describes feeling fatigued during the day, needing sleep vs feeling a decreased need for sleep consistent with dylon. Patient expresses discomfort with social situations like going to bars, citing loudness and crowds as triggers for anxiety. He denies current SI/HI/AVH.    Subjective      Review of Systems:   Ears, Nose, Mouth, & Throat: Denies difficulty hearing, smelling, sinus problems, sore throat, or dentition.  Heart/Vascular: Denies rapid heart rate, chest pain, swelling of feet or legs   Respiratory: Denies shortness of breath, cough, or wheeze  GI/: Denies nausea, vomiting, constipation, diarrhea, abdominal pain, painful urination, frequent urination  MSK: Denies swelling or joint deformities  Skin: Denies lesions or rash  Neurologic: Denies headaches, dizziness, or tremor   Endocrine: Denies heat or cold intolerance   Hematologic: Denies easy bruising or bleeding  Psychiatric: Endorses insomnia, anxiety; denies irritability, depression, recurrent bad thoughts, mood swings, audio/visual hallucinations, compulsions    PHQ-9 Depression Screening  Little interest or pleasure in doing things? Several days   Feeling down, depressed, or hopeless? Not at all   PHQ-2 Total Score 1   Trouble falling or staying asleep, or sleeping too much? Over half   Feeling tired or having little energy? Over half   Poor appetite or overeating? Almost all   Feeling bad about yourself - or that you are a failure or have let yourself or your family down? Several days   Trouble concentrating on things, such as reading the newspaper or watching television? Not at all   Moving or speaking so  slowly that other people could have noticed? Or the opposite - being so fidgety or restless that you have been moving around a lot more than usual? Not at all   Thoughts that you would be better off dead, or of hurting yourself in some way? Not at all   PHQ-9 Total Score 9   If you checked off any problems, how difficult have these problems made it for you to do your work, take care of things at home, or get along with other people? Not difficult at all         CORNELIA-7      Over the last two weeks, how often have you been bothered by the following problems?  Feeling nervous, anxious or on edge: More than half the days  Not being able to stop or control worrying: Not at all  Worrying too much about different things: Several days  Trouble Relaxing: More than half the days  Being so restless that it is hard to sit still: Several days  Becoming easily annoyed or irritable: More than half the days  Feeling afraid as if something awful might happen: Not at all  CORNELIA 7 Total Score: 8  If you checked any problems, how difficult have these problems made it for you to do your work, take care of things at home, or get along with other people: Somewhat difficult    Patient History:   The following portions of the patient's history were reviewed and updated as appropriate: allergies, current medications, past family history, past medical history, past social history, past surgical history and problem list.     Social History     Socioeconomic History    Marital status: Single   Tobacco Use    Smoking status: Former     Current packs/day: 0.00     Average packs/day: 0.5 packs/day for 13.0 years (6.5 ttl pk-yrs)     Types: Cigarettes     Start date: 2009     Quit date: 2022     Years since quittin.4     Passive exposure: Past    Smokeless tobacco: Never    Tobacco comments:     I was a smoker. Never got above half a pack per day. I use tobacco free nicotine vapes.   Vaping Use    Vaping status: Every Day    Substances:  "Nicotine, Flavoring    Devices: Disposable   Substance and Sexual Activity    Alcohol use: Yes     Alcohol/week: 5.0 standard drinks of alcohol     Types: 5 Standard drinks or equivalent per week    Drug use: Yes     Comment: CBD GUMMIES    Sexual activity: Not Currently     Partners: Female     Birth control/protection: Condom       Family History   Problem Relation Age of Onset    Anxiety disorder Mother     Heart disease Father     Heart disease Paternal Grandfather          of heart disease at age 49    Anxiety disorder Sister        Medications:     Current Outpatient Medications:     hydrOXYzine (ATARAX) 25 MG tablet, Take 1 tablet by mouth 3 (Three) Times a Day As Needed for Itching., Disp: 90 tablet, Rfl: 0    lisinopril (PRINIVIL,ZESTRIL) 40 MG tablet, TAKE 1 TABLET BY MOUTH EVERY DAY, Disp: 90 tablet, Rfl: 0    OLANZapine (zyPREXA) 10 MG tablet, Take 1 tablet by mouth Every Night for 30 days., Disp: 30 tablet, Rfl: 0    pantoprazole (PROTONIX) 40 MG EC tablet, Take 1 tablet by mouth Daily., Disp: 90 tablet, Rfl: 3    rosuvastatin (CRESTOR) 40 MG tablet, TAKE 1 TABLET BY MOUTH EVERY DAY AT NIGHT, Disp: 90 tablet, Rfl: 0    traZODone (DESYREL) 100 MG tablet, Take 0.5-1 tablets by mouth Every Night. FUTURE REFILLS TO BE PRESCRIBED BY PSYCHIATRY., Disp: 90 tablet, Rfl: 0    venlafaxine XR (Effexor XR) 37.5 MG 24 hr capsule, Take 1 capsule by mouth Daily for 30 days., Disp: 30 capsule, Rfl: 0    Objective     Physical Exam:  Vital Signs:   Vitals:    24 1529   BP: 138/87   Pulse: 89   SpO2: 96%   Weight: (!) 150 kg (331 lb)   Height: 184.4 cm (72.6\")     Body mass index is 44.16 kg/m².     Mental Status Exam:   The patient was alert and oriented to time, place, person, and situation. Neatly groomed and dressed. Good eye contact. Cooperative and answers questions willingly. No evidence of gait imbalance or shuffling. Normal speech rate, rhythm, and tone. Reports an anxious mood. Affect congruent with " mood. Denies current SI/HI/AVH. Organized thought process. No evidence of delusional thinking. Fair insight, fair judgement. Impulse control intact. Memory intact with average to above average intellectual functioning.     Allergies   Allergen Reactions    Nickel Rash      Current Medications:   Current Outpatient Medications   Medication Sig Dispense Refill    hydrOXYzine (ATARAX) 25 MG tablet Take 1 tablet by mouth 3 (Three) Times a Day As Needed for Itching. 90 tablet 0    lisinopril (PRINIVIL,ZESTRIL) 40 MG tablet TAKE 1 TABLET BY MOUTH EVERY DAY 90 tablet 0    OLANZapine (zyPREXA) 10 MG tablet Take 1 tablet by mouth Every Night for 30 days. 30 tablet 0    pantoprazole (PROTONIX) 40 MG EC tablet Take 1 tablet by mouth Daily. 90 tablet 3    rosuvastatin (CRESTOR) 40 MG tablet TAKE 1 TABLET BY MOUTH EVERY DAY AT NIGHT 90 tablet 0    traZODone (DESYREL) 100 MG tablet Take 0.5-1 tablets by mouth Every Night. FUTURE REFILLS TO BE PRESCRIBED BY PSYCHIATRY. 90 tablet 0    venlafaxine XR (Effexor XR) 37.5 MG 24 hr capsule Take 1 capsule by mouth Daily for 30 days. 30 capsule 0     No current facility-administered medications for this visit.       @RESULASTCBCDIFFPANEL,TSH,LABLIPI,[id number],CIAVTCYM26,MG,FOLATE,PROLACTIN,CRPRESULT,CMP,[id number])@    Lab Results   Component Value Date    GLUCOSE 128 (H) 08/12/2024    BUN 11 08/12/2024    CREATININE 0.76 08/12/2024    EGFRRESULT 108.4 07/30/2024    EGFR 120.2 08/12/2024    BCR 14.5 08/12/2024    K 4.2 08/12/2024    CO2 23.5 08/12/2024    CALCIUM 9.0 08/12/2024    PROTENTOTREF 7.1 07/30/2024    ALBUMIN 4.2 08/12/2024    BILITOT 0.7 08/12/2024    AST 11 08/12/2024    ALT 11 08/12/2024       Lab Results   Component Value Date    WBC 6.00 08/12/2024    HGB 13.8 08/12/2024    HCT 40.2 08/12/2024    MCV 95.3 08/12/2024     08/12/2024       Lab Results   Component Value Date    CHLPL 198 07/30/2024    TRIG 125 07/30/2024    HDL 43 07/30/2024     (H) 07/30/2024                   Assessment / Plan      Impression/Treatment Plan:  The patient reports no improvement in sleep since increasing olanzapine to 10 mg, experiencing persistent daytime somnolence, cognitive fog, and significant weight gain of 19 pounds over 3 weeks. Anxiety has slightly improved. The patient describes chronic insomnia, achieving only 4-6 hours of sleep nightly, and social anxiety in crowded or loud environments. The plan includes decreasing olanzapine to 5 mg, starting venlafaxine for anxiety and sleep issues, and monitoring weight.    1. Insomnia and anxiety:  - Patient reports no improvement in sleep since increasing olanzapine to 10 mg, but experiences daytime grogginess and brain fog.  - Patient reports sleeping 4-6 hours per night on olanzapine, which is insufficient.  - Plan: Decrease olanzapine to 5 mg for one week, then discontinue. Start venlafaxine (Effexor) XR 37.5 mg at night for anxiety and sleep issues. Monitor patient's response to the medication and adjust as needed. Patient was educated about potential for inducing dylon with SNRI if he is indeed bipolar. Based on his report today, his symptoms align more with CORNELIA, social anxiety, with a history of panic attacks. The patient was instructed to call the office if he experiences a decreased need for sleep, elevated/expansive mood, AVH, paranoia, and risky behaviors after starting venlafaxine. The patient verbalizes understanding and is in agreement with this plan.    2. Possible bipolar disorder:  - Patient denies experiencing elevated mood or periods of not needing sleep, but reports difficulty sleeping and anxiety.  - No history of manic episodes reported.  - Plan: Start venlafaxine (Effexor) and monitor for any signs of worsening symptoms indicative of bipolar disorder. If symptoms worsen, discontinue venlafaxine and consider alternative medications such as aripiprazole (Abilify).    Follow-up:  - Schedule a follow-up appointment in  one week to assess the patient's response to venlafaxine and any changes in symptoms.  - Instruct the patient to call the provider if he experiences worsening hallucinations, sleep issues, or any concerning side effects from the medication.        Diagnoses and all orders for this visit:    1. Severe anxiety with panic (Primary)  -     venlafaxine XR (Effexor XR) 37.5 MG 24 hr capsule; Take 1 capsule by mouth Daily for 30 days.  Dispense: 30 capsule; Refill: 0    2. Social anxiety disorder  -     venlafaxine XR (Effexor XR) 37.5 MG 24 hr capsule; Take 1 capsule by mouth Daily for 30 days.  Dispense: 30 capsule; Refill: 0    3. Insomnia, unspecified type        MEDS ORDERED DURING VISIT:  New Medications Ordered This Visit   Medications    venlafaxine XR (Effexor XR) 37.5 MG 24 hr capsule     Sig: Take 1 capsule by mouth Daily for 30 days.     Dispense:  30 capsule     Refill:  0       Follow up with Jasmina Avelar, JANEEN, APRN, PMHNP-BC in this office in 2 weeks.          PATIENT EDUCATION:  Discussed medication options and treatment plan of prescribed medication(s) as well as the risks, benefits, and potential side effects. Patient is agreeable to call the office with any worsening of symptoms or onset of side effects. Patient is agreeable to call 911 or go to the nearest ER should he/she begin having SI/HI.    SAFETY PLAN:  Patient was given ample time for questions and fully participated in treatment planning.  Patient was encouraged to call the clinic with any questions or concerns.  Patient was informed of access to emergency care. If patient were to develop any significant symptomatology, suicidal ideation, homicidal ideation, any concerns, or feel unsafe at any time they are to call the clinic and if unable to get immediate assistance should immediately call 911 or go to the nearest emergency room.  The patient is advised to remove or secure (lock away) all lethal weapons (including guns) and sharps  (including razors, scissors, knives, etc.).  All medications (including any prescribed and any over the counter medications) should be stored in a safe and secured location that is not obtainable by children/adolescents.  Patient was given an opportunity and encouraged to ask questions about their medication, illness, and treatment. Patient contracted verbally for the following: If you are experiencing an emotional crisis or have thoughts of harming yourself or others, please go to your nearest local emergency room or call 911. Will continue to re-assess medication response and side effects frequently to establish efficacy and ensure safety. Risks, any black box warnings, side effects, off label usage, and benefits of medication and treatment discussed with patient, along with potential adverse side effects of current and/or newly prescribed medication, alternative treatment options, and OTC medications.  Patient verbalized understanding of potential risks, any off label use of medication, any black box warnings, and any side effects in their own words. The patient verbalized understanding and agreed to comply with the safety plan discussed in their own words.  Patient given the number to the office. Number also available to the 24- hour suicide hotline.     Short Term Goals: Continue building rapport with the patient. Patient will be compliant with medication, and patient will have no significant medication related side effects.  Patient will be engaged in psychotherapy as indicated.  Patient will report subjective improvement of symptoms.     Long term goals: To stabilize mood and treat/improve subjective symptoms, the patient will stay out of the hospital, the patient will be at an optimal level of functioning, and the patient will take all medications as prescribed.     Rodger reviewed and is appropriate.    Copied text in this note has been reviewed and is accurate as of 12/27/2024.    Part of this note may be  an electronic transcription/translation of spoken language to printed text using the Dragon Dictation System.    Jasmina Avelar, DNP, APRN, PMHNP-BC

## 2024-12-30 ENCOUNTER — TELEPHONE (OUTPATIENT)
Age: 34
End: 2024-12-30
Payer: COMMERCIAL

## 2024-12-30 DIAGNOSIS — G47.00 INSOMNIA, UNSPECIFIED TYPE: Primary | ICD-10-CM

## 2024-12-30 DIAGNOSIS — F31.9 BIPOLAR I DISORDER WITH MIXED FEATURES: ICD-10-CM

## 2024-12-30 RX ORDER — OLANZAPINE 5 MG/1
5 TABLET ORAL NIGHTLY
Qty: 7 TABLET | Refills: 0 | Status: SHIPPED | OUTPATIENT
Start: 2024-12-30 | End: 2025-01-06

## 2024-12-30 NOTE — TELEPHONE ENCOUNTER
Patient/[facility] is requesting prescription refill on Olanzapine 5mg. This isn't in patients med management.

## 2025-01-03 DIAGNOSIS — F31.9 BIPOLAR I DISORDER WITH MIXED FEATURES: ICD-10-CM

## 2025-01-03 RX ORDER — OLANZAPINE 5 MG/1
5 TABLET ORAL NIGHTLY
Qty: 7 TABLET | Refills: 0 | Status: CANCELLED | OUTPATIENT
Start: 2025-01-03 | End: 2025-01-10

## 2025-01-03 NOTE — TELEPHONE ENCOUNTER
Rx Refill Note  Requested Prescriptions     Pending Prescriptions Disp Refills    OLANZapine (ZyPREXA) 5 MG tablet 7 tablet 0     Sig: Take 1 tablet by mouth Every Night for 7 days. Take one 5 mg tablet by mouth at night for 1 week and then discontinue.      Last office visit with prescribing clinician: 12/27/2024   Last telemedicine visit with prescribing clinician: Visit date not found   Next office visit with prescribing clinician: 1/7/2025     Waleska Hemphill MA  01/03/25, 11:57 EST

## 2025-01-07 DIAGNOSIS — F41.9 ANXIETY: ICD-10-CM

## 2025-01-07 RX ORDER — HYDROXYZINE HYDROCHLORIDE 25 MG/1
25 TABLET, FILM COATED ORAL 3 TIMES DAILY PRN
Qty: 90 TABLET | Refills: 0 | Status: CANCELLED | OUTPATIENT
Start: 2025-01-07

## 2025-01-07 NOTE — TELEPHONE ENCOUNTER
Rx Refill Note  Requested Prescriptions     Pending Prescriptions Disp Refills    hydrOXYzine (ATARAX) 25 MG tablet 90 tablet 0     Sig: Take 1 tablet by mouth 3 (Three) Times a Day As Needed for Itching.      Last office visit with prescribing clinician: 12/27/2024   Last telemedicine visit with prescribing clinician: Visit date not found   Next office visit with prescribing clinician: 1/15/2025     Waleska Hemphill MA  01/07/25, 16:15 EST

## 2025-01-08 DIAGNOSIS — F41.9 ANXIETY: ICD-10-CM

## 2025-01-08 RX ORDER — HYDROXYZINE HYDROCHLORIDE 25 MG/1
25 TABLET, FILM COATED ORAL 3 TIMES DAILY PRN
Qty: 90 TABLET | Refills: 2 | Status: SHIPPED | OUTPATIENT
Start: 2025-01-08

## 2025-01-15 ENCOUNTER — OFFICE VISIT (OUTPATIENT)
Age: 35
End: 2025-01-15
Payer: COMMERCIAL

## 2025-01-15 VITALS
BODY MASS INDEX: 41.75 KG/M2 | OXYGEN SATURATION: 92 % | HEART RATE: 98 BPM | HEIGHT: 73 IN | DIASTOLIC BLOOD PRESSURE: 89 MMHG | WEIGHT: 315 LBS | SYSTOLIC BLOOD PRESSURE: 146 MMHG

## 2025-01-15 DIAGNOSIS — F41.1 GENERALIZED ANXIETY DISORDER: Primary | ICD-10-CM

## 2025-01-15 DIAGNOSIS — F41.0 SEVERE ANXIETY WITH PANIC: ICD-10-CM

## 2025-01-15 DIAGNOSIS — F41.1 GENERALIZED ANXIETY DISORDER: ICD-10-CM

## 2025-01-15 DIAGNOSIS — G47.00 INSOMNIA, UNSPECIFIED TYPE: ICD-10-CM

## 2025-01-15 DIAGNOSIS — F40.10 SOCIAL ANXIETY DISORDER: ICD-10-CM

## 2025-01-15 DIAGNOSIS — F41.9 ANXIETY: ICD-10-CM

## 2025-01-15 DIAGNOSIS — F41.0 SEVERE ANXIETY WITH PANIC: Primary | ICD-10-CM

## 2025-01-15 RX ORDER — VENLAFAXINE 37.5 MG/1
37.5 TABLET ORAL 2 TIMES DAILY
Qty: 60 TABLET | Refills: 1 | Status: SHIPPED | OUTPATIENT
Start: 2025-01-15 | End: 2025-01-16

## 2025-01-15 RX ORDER — VENLAFAXINE 37.5 MG/1
37.5 TABLET ORAL 2 TIMES DAILY
Qty: 60 TABLET | Refills: 1 | Status: CANCELLED | OUTPATIENT
Start: 2025-01-15 | End: 2025-03-16

## 2025-01-15 NOTE — TELEPHONE ENCOUNTER
Rx Refill Note  Requested Prescriptions     Pending Prescriptions Disp Refills    venlafaxine (EFFEXOR) 37.5 MG tablet 60 tablet 1     Sig: Take 1 tablet by mouth 2 (Two) Times a Day for 60 days. Take 1/2 tablet two times per day.      Last office visit with prescribing clinician: 1/15/2025   Last telemedicine visit with prescribing clinician: Visit date not found   Next office visit with prescribing clinician: 2/17/2025     Waleska Hemphill MA  01/15/25, 16:00 EST

## 2025-01-15 NOTE — PROGRESS NOTES
"     Office  Follow Up Visit      Patient Name: Kenny Man Jr.  : 1990   MRN: [id number]     Referring Provider: Rosio Rondon APRN    Chief Complaint:  \"management of anxiety and insomnia\"      ICD-10-CM ICD-9-CM   1. Generalized anxiety disorder  F41.1 300.02   2. Insomnia, unspecified type  G47.00 780.52   3. Severe anxiety with panic  F41.0 300.01   4. Social anxiety disorder  F40.10 300.23        History of Present Illness:   Kenny Man Jr. is a 34 y.o. male presenting for a routine follow up appointment for psychiatric medication management. He was initially seen in this clinic for management of anxiety (CORNELIA, social, panic attacks) and insomnia. The patient reports significant improvements in sleep since starting venlafaxine, now achieving 8 hours per night, which is a notable change from his previous patterns. He also describes a substantial reduction in anxiety and panic attacks, rarely feeling anxious and not experiencing panic attacks recently. The patient's mood is stable, with no depressive symptoms reported, as his primary concern was anxiety. He denies any thoughts of self-harm or harm to others and reports no hallucinations. The patient is satisfied with his current medication and dosage, acknowledging that while it is sufficient now, he is open to future adjustments if needed. He expresses a preference for a tablet form of the medication due to his vegan lifestyle, as the current gelatin capsules are not suitable.      Subjective      Review of Systems:   Ears, Nose, Mouth, & Throat: Denies difficulty hearing, smelling, sinus problems, sore throat, or dentition.  Heart/Vascular: Denies rapid heart rate, chest pain, swelling of feet or legs   Respiratory: Denies shortness of breath, cough, or wheeze  GI/: Denies nausea, vomiting, constipation, diarrhea, abdominal pain, painful urination, frequent urination  MSK: Denies swelling or joint deformities  Skin: Denies lesions or " rash  Neurologic: Denies headaches, dizziness, or tremor   Endocrine: Denies heat or cold intolerance   Hematologic: Denies easy bruising or bleeding  Psychiatric: Denies insomnia, irritability, depression, anxiety, recurrent bad thoughts, mood swings, hallucinations, compulsions    PHQ-9 Depression Screening  Little interest or pleasure in doing things? Not at all   Feeling down, depressed, or hopeless? Not at all   PHQ-2 Total Score 0   Trouble falling or staying asleep, or sleeping too much? Not at all   Feeling tired or having little energy? Not at all   Poor appetite or overeating? Not at all   Feeling bad about yourself - or that you are a failure or have let yourself or your family down? Not at all   Trouble concentrating on things, such as reading the newspaper or watching television? Not at all   Moving or speaking so slowly that other people could have noticed? Or the opposite - being so fidgety or restless that you have been moving around a lot more than usual? Not at all   Thoughts that you would be better off dead, or of hurting yourself in some way? Not at all   PHQ-9 Total Score 0   If you checked off any problems, how difficult have these problems made it for you to do your work, take care of things at home, or get along with other people? Not difficult at all         CORNELIA-7      Over the last two weeks, how often have you been bothered by the following problems?  Feeling nervous, anxious or on edge: Not at all  Not being able to stop or control worrying: Not at all  Worrying too much about different things: Not at all  Trouble Relaxing: Not at all  Being so restless that it is hard to sit still: Not at all  Becoming easily annoyed or irritable: Not at all  Feeling afraid as if something awful might happen: Not at all  CORNELIA 7 Total Score: 0  If you checked any problems, how difficult have these problems made it for you to do your work, take care of things at home, or get along with other people: Not  difficult at all    Patient History:   The following portions of the patient's history were reviewed and updated as appropriate: allergies, current medications, past family history, past medical history, past social history, past surgical history and problem list.     Social History     Socioeconomic History    Marital status: Single   Tobacco Use    Smoking status: Former     Current packs/day: 0.00     Average packs/day: 0.5 packs/day for 13.0 years (6.5 ttl pk-yrs)     Types: Cigarettes     Start date: 2009     Quit date: 2022     Years since quittin.5     Passive exposure: Past    Smokeless tobacco: Never    Tobacco comments:     I was a smoker. Never got above half a pack per day. I use tobacco free nicotine vapes.   Vaping Use    Vaping status: Every Day    Substances: Nicotine, Flavoring    Devices: Disposable   Substance and Sexual Activity    Alcohol use: Yes     Alcohol/week: 5.0 standard drinks of alcohol     Types: 5 Standard drinks or equivalent per week    Drug use: Yes     Comment: CBD GUMMIES    Sexual activity: Not Currently     Partners: Female     Birth control/protection: Condom       Family History   Problem Relation Age of Onset    Anxiety disorder Mother     Heart disease Father     Heart disease Paternal Grandfather          of heart disease at age 49    Anxiety disorder Sister        Medications:     Current Outpatient Medications:     hydrOXYzine (ATARAX) 25 MG tablet, Take 1 tablet by mouth 3 (Three) Times a Day As Needed for Itching., Disp: 90 tablet, Rfl: 2    lisinopril (PRINIVIL,ZESTRIL) 40 MG tablet, TAKE 1 TABLET BY MOUTH EVERY DAY, Disp: 90 tablet, Rfl: 0    OLANZapine (ZyPREXA) 5 MG tablet, Take 1 tablet by mouth Every Night for 7 days. Take one 5 mg tablet by mouth at night for 1 week and then discontinue., Disp: 7 tablet, Rfl: 0    pantoprazole (PROTONIX) 40 MG EC tablet, Take 1 tablet by mouth Daily., Disp: 90 tablet, Rfl: 3    rosuvastatin (CRESTOR) 40 MG  "tablet, TAKE 1 TABLET BY MOUTH EVERY DAY AT NIGHT, Disp: 90 tablet, Rfl: 0    traZODone (DESYREL) 100 MG tablet, Take 0.5-1 tablets by mouth Every Night. FUTURE REFILLS TO BE PRESCRIBED BY PSYCHIATRY., Disp: 90 tablet, Rfl: 0    venlafaxine (EFFEXOR) 37.5 MG tablet, Take 1 tablet by mouth 2 (Two) Times a Day for 60 days. Take 1/2 tablet two times per day., Disp: 60 tablet, Rfl: 1    Objective     Physical Exam:  Vital Signs:   Vitals:    01/15/25 0841   BP: 146/89   Pulse: 98   SpO2: 92%   Weight: (!) 151 kg (333 lb)   Height: 184.4 cm (72.6\")     Body mass index is 44.42 kg/m².     Mental Status Exam:   The patient was alert and oriented to time, place, person, and situation. Neatly groomed and dressed. Good eye contact. Cooperative and answers questions willingly. No evidence of gait imbalance or shuffling. Normal speech rate, rhythm, and tone. Reports good mood. Affect congruent with mood. Denies SI/HI/AVH. Organized thought process. No evidence of delusional thinking. Good insight, good judgement. Impulse control intact. Memory intact with average to above average intellectual functioning.     Allergies   Allergen Reactions    Nickel Rash        Current Medications:   Current Outpatient Medications   Medication Sig Dispense Refill    hydrOXYzine (ATARAX) 25 MG tablet Take 1 tablet by mouth 3 (Three) Times a Day As Needed for Itching. 90 tablet 2    lisinopril (PRINIVIL,ZESTRIL) 40 MG tablet TAKE 1 TABLET BY MOUTH EVERY DAY 90 tablet 0    OLANZapine (ZyPREXA) 5 MG tablet Take 1 tablet by mouth Every Night for 7 days. Take one 5 mg tablet by mouth at night for 1 week and then discontinue. 7 tablet 0    pantoprazole (PROTONIX) 40 MG EC tablet Take 1 tablet by mouth Daily. 90 tablet 3    rosuvastatin (CRESTOR) 40 MG tablet TAKE 1 TABLET BY MOUTH EVERY DAY AT NIGHT 90 tablet 0    traZODone (DESYREL) 100 MG tablet Take 0.5-1 tablets by mouth Every Night. FUTURE REFILLS TO BE PRESCRIBED BY PSYCHIATRY. 90 tablet 0    " venlafaxine (EFFEXOR) 37.5 MG tablet Take 1 tablet by mouth 2 (Two) Times a Day for 60 days. Take 1/2 tablet two times per day. 60 tablet 1     No current facility-administered medications for this visit.       @RESULASTCBCDIFFPANEL,TSH,LABLIPI,UECESSFL20,HIQUCODE81,MG,FOLATE,PROLACTIN,CRPRESULT,CMP,[id number])@    Lab Results   Component Value Date    GLUCOSE 128 (H) 08/12/2024    BUN 11 08/12/2024    CREATININE 0.76 08/12/2024    EGFRRESULT 108.4 07/30/2024    EGFR 120.2 08/12/2024    BCR 14.5 08/12/2024    K 4.2 08/12/2024    CO2 23.5 08/12/2024    CALCIUM 9.0 08/12/2024    PROTENTOTREF 7.1 07/30/2024    ALBUMIN 4.2 08/12/2024    BILITOT 0.7 08/12/2024    AST 11 08/12/2024    ALT 11 08/12/2024       Lab Results   Component Value Date    WBC 6.00 08/12/2024    HGB 13.8 08/12/2024    HCT 40.2 08/12/2024    MCV 95.3 08/12/2024     08/12/2024       Lab Results   Component Value Date    CHLPL 198 07/30/2024    TRIG 125 07/30/2024    HDL 43 07/30/2024     (H) 07/30/2024                  Assessment / Plan      Impression/Treatment Plan:  Kenny FAIR Magda Michael is a 34 y.o. male presenting for a routine follow up appointment for psychiatric medication management. Patient initially described chronic insomnia, typically achieving 4-6 hours of sleep nightly, with subsequent daytime fatigue and cognitive impairment affecting work performance. He reports history of hypervigilance and increased hallucinations during a period of occupational stress with a difficult supervisor, which have since decreased after changing work environments.     Patient identified sleep disturbance as the most burdensome symptom, followed by anxiety which can lead to abdominal cramping when prolonged. He describes difficulty initiating sleep without sleep aids due to intrusive thoughts. He reports symptoms consistent with social anxiety, particularly in crowded or loud environments like bars.     We discussed potential bipolar symptoms  during this appointment and he denies periods of elevated mood or decreased need for sleep consistent with dylon. He reports visual disturbances primarily as shadows and movement in peripheral vision, which have historically occurred with increased stress. He describes a past period of constant anxiety lasting 2.5 years due to work stress. Although he reports insomnia, he describes feeling fatigued during the day, needing sleep vs feeling a decreased need for sleep consistent with dylon. Patient expresses discomfort with social situations like going to bars, citing loudness and crowds as triggers for anxiety.     Today the patient reports significant improvements in sleep, anxiety, and panic attacks, now achieving 8 hours of sleep per night and rarely feeling anxious since starting venlafaxine. He is satisfied with his current medication but prefers a tablet form due to his vegan lifestyle. The patient denies any thoughts of self-harm or harm to others and reports no hallucinations. The plan includes continuing the current medication regimen, switching to a tablet form, and scheduling a follow-up in one month.    1. Anxiety and Panic Attacks  - The patient reports significant improvement in anxiety and panic attacks, with no recent episodes of panic attacks. The current medication regimen appears to be effective. The patient confirmed no thoughts of self-harm or harm to others.  Plan:    - Discontinue venlafaxine XR 37.5 mg daily - new prescription venlafaxine 12.5 mg two times per day.    - Reevaluate in one month to assess the need for any dosage adjustments, especially if the medication plateaus.    - Encourage the patient to report any worsening of symptoms or side effects.    2. Medication Formulation Concern (Vegan)  - The patient expressed a preference for a tablet form of the medication due to the gelatin capsules not being vegan.  Plan:    - Switch to the tablet form of the medication at the same dosage,  with the understanding that it may not be extended-release.    - Reevaluate the effectiveness of the tablet form during the next visit and adjust as necessary.    Follow-up:  - Schedule a follow-up appointment in one month to assess the patient's progress and make any necessary adjustments to the treatment plan.  - Instructed the patient to contact the clinic if he experiences any issues with the medication or if his symptoms worsen before the scheduled follow-up.      Diagnoses and all orders for this visit:    1. Generalized anxiety disorder (Primary)    2. Insomnia, unspecified type  -     venlafaxine (EFFEXOR) 37.5 MG tablet; Take 1 tablet by mouth 2 (Two) Times a Day for 60 days. Take 1/2 tablet two times per day.  Dispense: 60 tablet; Refill: 1    3. Severe anxiety with panic  -     venlafaxine (EFFEXOR) 37.5 MG tablet; Take 1 tablet by mouth 2 (Two) Times a Day for 60 days. Take 1/2 tablet two times per day.  Dispense: 60 tablet; Refill: 1    4. Social anxiety disorder  -     venlafaxine (EFFEXOR) 37.5 MG tablet; Take 1 tablet by mouth 2 (Two) Times a Day for 60 days. Take 1/2 tablet two times per day.  Dispense: 60 tablet; Refill: 1        MEDS ORDERED DURING VISIT:  New Medications Ordered This Visit   Medications    venlafaxine (EFFEXOR) 37.5 MG tablet     Sig: Take 1 tablet by mouth 2 (Two) Times a Day for 60 days. Take 1/2 tablet two times per day.     Dispense:  60 tablet     Refill:  1       Follow up with Jasmina Avelar, DNP, APRN, PMHNP-BC in this office in one month.          PATIENT EDUCATION:  Discussed medication options and treatment plan of prescribed medication(s) as well as the risks, benefits, and potential side effects. Patient is agreeable to call the office with any worsening of symptoms or onset of side effects. Patient is agreeable to call 911 or go to the nearest ER should he/she begin having SI/HI.    SAFETY PLAN:  Patient was given ample time for questions and fully  participated in treatment planning.  Patient was encouraged to call the clinic with any questions or concerns.  Patient was informed of access to emergency care. If patient were to develop any significant symptomatology, suicidal ideation, homicidal ideation, any concerns, or feel unsafe at any time they are to call the clinic and if unable to get immediate assistance should immediately call 911 or go to the nearest emergency room.  The patient is advised to remove or secure (lock away) all lethal weapons (including guns) and sharps (including razors, scissors, knives, etc.).  All medications (including any prescribed and any over the counter medications) should be stored in a safe and secured location that is not obtainable by children/adolescents.  Patient was given an opportunity and encouraged to ask questions about their medication, illness, and treatment. Patient contracted verbally for the following: If you are experiencing an emotional crisis or have thoughts of harming yourself or others, please go to your nearest local emergency room or call 911. Will continue to re-assess medication response and side effects frequently to establish efficacy and ensure safety. Risks, any black box warnings, side effects, off label usage, and benefits of medication and treatment discussed with patient, along with potential adverse side effects of current and/or newly prescribed medication, alternative treatment options, and OTC medications.  Patient verbalized understanding of potential risks, any off label use of medication, any black box warnings, and any side effects in their own words. The patient verbalized understanding and agreed to comply with the safety plan discussed in their own words.  Patient given the number to the office. Number also available to the 24- hour suicide hotline.     Short Term Goals: Continue building rapport with the patient. Patient will be compliant with medication, and patient will have no  significant medication related side effects.  Patient will be engaged in psychotherapy as indicated.  Patient will report subjective improvement of symptoms.     Long term goals: To stabilize mood and treat/improve subjective symptoms, the patient will stay out of the hospital, the patient will be at an optimal level of functioning, and the patient will take all medications as prescribed.     Rodger reviewed and is appropriate.    Copied text in this note has been reviewed and is accurate as of 1/15/2025.    Part of this note may be an electronic transcription/translation of spoken language to printed text using the Dragon Dictation System.    Jasmina Avelar, DNP, APRN, PMHNP-BC

## 2025-01-16 ENCOUNTER — TELEPHONE (OUTPATIENT)
Age: 35
End: 2025-01-16
Payer: COMMERCIAL

## 2025-01-16 DIAGNOSIS — G47.00 INSOMNIA, UNSPECIFIED TYPE: ICD-10-CM

## 2025-01-16 DIAGNOSIS — F41.0 SEVERE ANXIETY WITH PANIC: ICD-10-CM

## 2025-01-16 DIAGNOSIS — F40.10 SOCIAL ANXIETY DISORDER: ICD-10-CM

## 2025-01-16 RX ORDER — VENLAFAXINE 37.5 MG/1
TABLET ORAL
Qty: 60 TABLET | Refills: 0 | Status: SHIPPED | OUTPATIENT
Start: 2025-01-16

## 2025-01-16 RX ORDER — HYDROXYZINE HYDROCHLORIDE 25 MG/1
25 TABLET, FILM COATED ORAL EVERY 8 HOURS PRN
Qty: 90 TABLET | Refills: 0 | Status: SHIPPED | OUTPATIENT
Start: 2025-01-16 | End: 2025-02-15

## 2025-01-16 NOTE — TELEPHONE ENCOUNTER
Patient pharmacy has sent over a letter for clarification about his medication Venlafaxine it's two different directions on the Rx.

## 2025-01-31 ENCOUNTER — OFFICE VISIT (OUTPATIENT)
Dept: FAMILY MEDICINE CLINIC | Facility: CLINIC | Age: 35
End: 2025-01-31
Payer: COMMERCIAL

## 2025-01-31 VITALS
WEIGHT: 315 LBS | SYSTOLIC BLOOD PRESSURE: 122 MMHG | HEART RATE: 87 BPM | BODY MASS INDEX: 41.75 KG/M2 | OXYGEN SATURATION: 94 % | DIASTOLIC BLOOD PRESSURE: 86 MMHG | HEIGHT: 73 IN

## 2025-01-31 DIAGNOSIS — R73.9 ELEVATED SERUM GLUCOSE: ICD-10-CM

## 2025-01-31 DIAGNOSIS — E78.2 MIXED HYPERLIPIDEMIA: Chronic | ICD-10-CM

## 2025-01-31 DIAGNOSIS — I10 ESSENTIAL HYPERTENSION: Primary | Chronic | ICD-10-CM

## 2025-01-31 DIAGNOSIS — E55.9 VITAMIN D DEFICIENCY: Chronic | ICD-10-CM

## 2025-01-31 LAB
25(OH)D3+25(OH)D2 SERPL-MCNC: 9.3 NG/ML (ref 30–100)
ALBUMIN SERPL-MCNC: 4.4 G/DL (ref 3.5–5.2)
ALBUMIN/GLOB SERPL: 1.6 G/DL
ALP SERPL-CCNC: 58 U/L (ref 39–117)
ALT SERPL-CCNC: 18 U/L (ref 1–41)
AST SERPL-CCNC: 21 U/L (ref 1–40)
BASOPHILS # BLD AUTO: 0.03 10*3/MM3 (ref 0–0.2)
BASOPHILS NFR BLD AUTO: 0.6 % (ref 0–1.5)
BILIRUB SERPL-MCNC: 1.2 MG/DL (ref 0–1.2)
BUN SERPL-MCNC: 17 MG/DL (ref 6–20)
BUN/CREAT SERPL: 20 (ref 7–25)
CALCIUM SERPL-MCNC: 9.3 MG/DL (ref 8.6–10.5)
CHLORIDE SERPL-SCNC: 102 MMOL/L (ref 98–107)
CHOLEST SERPL-MCNC: 246 MG/DL (ref 0–200)
CO2 SERPL-SCNC: 25.4 MMOL/L (ref 22–29)
CREAT SERPL-MCNC: 0.85 MG/DL (ref 0.76–1.27)
EGFRCR SERPLBLD CKD-EPI 2021: 115.5 ML/MIN/1.73
EOSINOPHIL # BLD AUTO: 0.15 10*3/MM3 (ref 0–0.4)
EOSINOPHIL NFR BLD AUTO: 2.9 % (ref 0.3–6.2)
ERYTHROCYTE [DISTWIDTH] IN BLOOD BY AUTOMATED COUNT: 11.6 % (ref 12.3–15.4)
EXPIRATION DATE: NORMAL
GLOBULIN SER CALC-MCNC: 2.8 GM/DL
GLUCOSE SERPL-MCNC: 104 MG/DL (ref 65–99)
HBA1C MFR BLD: 4.8 % (ref 4.5–5.7)
HCT VFR BLD AUTO: 41.1 % (ref 37.5–51)
HDLC SERPL-MCNC: 59 MG/DL (ref 40–60)
HGB BLD-MCNC: 14.3 G/DL (ref 13–17.7)
IMM GRANULOCYTES # BLD AUTO: 0.01 10*3/MM3 (ref 0–0.05)
IMM GRANULOCYTES NFR BLD AUTO: 0.2 % (ref 0–0.5)
LDLC SERPL CALC-MCNC: 167 MG/DL (ref 0–100)
LYMPHOCYTES # BLD AUTO: 1.46 10*3/MM3 (ref 0.7–3.1)
LYMPHOCYTES NFR BLD AUTO: 28 % (ref 19.6–45.3)
Lab: NORMAL
MCH RBC QN AUTO: 33.9 PG (ref 26.6–33)
MCHC RBC AUTO-ENTMCNC: 34.8 G/DL (ref 31.5–35.7)
MCV RBC AUTO: 97.4 FL (ref 79–97)
MONOCYTES # BLD AUTO: 0.56 10*3/MM3 (ref 0.1–0.9)
MONOCYTES NFR BLD AUTO: 10.7 % (ref 5–12)
NEUTROPHILS # BLD AUTO: 3 10*3/MM3 (ref 1.7–7)
NEUTROPHILS NFR BLD AUTO: 57.6 % (ref 42.7–76)
NRBC BLD AUTO-RTO: 0 /100 WBC (ref 0–0.2)
PLATELET # BLD AUTO: 227 10*3/MM3 (ref 140–450)
POTASSIUM SERPL-SCNC: 4.8 MMOL/L (ref 3.5–5.2)
PROT SERPL-MCNC: 7.2 G/DL (ref 6–8.5)
RBC # BLD AUTO: 4.22 10*6/MM3 (ref 4.14–5.8)
SODIUM SERPL-SCNC: 138 MMOL/L (ref 136–145)
TRIGL SERPL-MCNC: 111 MG/DL (ref 0–150)
VLDLC SERPL CALC-MCNC: 20 MG/DL (ref 5–40)
WBC # BLD AUTO: 5.21 10*3/MM3 (ref 3.4–10.8)

## 2025-01-31 PROCEDURE — 83036 HEMOGLOBIN GLYCOSYLATED A1C: CPT | Performed by: NURSE PRACTITIONER

## 2025-01-31 PROCEDURE — 99214 OFFICE O/P EST MOD 30 MIN: CPT | Performed by: NURSE PRACTITIONER

## 2025-01-31 NOTE — ASSESSMENT & PLAN NOTE
Vitamin D was low on prior lab.  Will re-assess Vitamin D blood level today.    Orders:    Vitamin D,25-Hydroxy

## 2025-01-31 NOTE — ASSESSMENT & PLAN NOTE
Lipid abnormalities are elevated.  She is taking Atorvastatin 40mg nightly.  Encouraged lifestyle changes.  Continue current treatment plan.  Will re-assess lipids today.    Orders:    Comprehensive Metabolic Panel    Lipid Panel

## 2025-01-31 NOTE — PROGRESS NOTES
"Chief Complaint  Hypertension (Araseli sugar /No flu or covid /)    Subjective        HPI   History of Present Illness      Kenny presents to [facility] for follow-up on Hypertension and Hyperlipidemia:     Hypertension - taking Lisinopril 40mg daily.  He will sometimes miss a day taking his BP medication here and there but generally he doesn't.  He has been cutting down on salt intake.  He denies SOB, CP, heart palpitations and syncope.     Hyperlipidemia -  taking Rosuvastatin 40mg nightly.  He has cut back on eating fried, fast, fatty foods.  He does not eat foods high in sugar.  He denies muscle aches.     Objective   Vital Signs:   Vitals:    01/31/25 0757 01/31/25 0817   BP: 132/90 122/86  Comment: RE-CHECKED   BP Location: Left arm Left arm   Patient Position: Sitting Sitting   Cuff Size: Adult Adult  Comment: Adult long cuff   Pulse: 87    SpO2: 94%    Weight: (!) 148 kg (326 lb)    Height: 184.4 cm (72.6\")            Physical Exam  Vitals and nursing note reviewed.   Constitutional:       General: He is not in acute distress.     Appearance: Normal appearance. He is not ill-appearing.   HENT:      Head: Normocephalic and atraumatic.   Cardiovascular:      Rate and Rhythm: Normal rate and regular rhythm.      Heart sounds: Normal heart sounds. No murmur heard.  Pulmonary:      Effort: Pulmonary effort is normal. No respiratory distress.      Breath sounds: Normal breath sounds. No wheezing.   Musculoskeletal:      Right lower leg: No edema.      Left lower leg: No edema.   Neurological:      Mental Status: He is alert.   Psychiatric:         Mood and Affect: Mood normal.         Behavior: Behavior normal.          Result Review :     The following data was reviewed by: STEPH Grewal on 01/31/2025:    POCT glycated hemoglobin, total (01/31/2025 08:01)     CBC & Differential (08/12/2024 16:59)  Comprehensive Metabolic Panel (08/12/2024 16:59)  Hemoglobin A1c (04/30/2024 " 14:28)      Assessment and Plan    Assessment & Plan  Essential hypertension  Hypertension is controlled on Lisinopril 40mg daily.  Continue current treatment plan.  Decrease table salt and foods high in salt.  Weight loss.  Increase activity daily.  Blood pressure will be re-assessed in 6 months during Annual Physical.    Orders:    CBC & Differential    Comprehensive Metabolic Panel    Mixed hyperlipidemia  Lipid abnormalities are elevated.  She is taking Atorvastatin 40mg nightly.  Encouraged lifestyle changes.  Continue current treatment plan.  Will re-assess lipids today.    Orders:    Comprehensive Metabolic Panel    Lipid Panel    Elevated serum glucose  Serum glucose blood level elevated on prior lab.  Previous A1c 5.0.  Will assess for diabetes today.    Orders:    POCT glycated hemoglobin, total    Vitamin D deficiency  Vitamin D was low on prior lab.  Will re-assess Vitamin D blood level today.    Orders:    Vitamin D,25-Hydroxy         Follow Up   Return in about 6 months (around 7/31/2025) for Annual physical.  Patient was given instructions and counseling regarding his condition or for health maintenance advice. Please see specific information pulled into the AVS if appropriate.

## 2025-01-31 NOTE — ASSESSMENT & PLAN NOTE
Hypertension is controlled on Lisinopril 40mg daily.  Continue current treatment plan.  Decrease table salt and foods high in salt.  Weight loss.  Increase activity daily.  Blood pressure will be re-assessed in 6 months during Annual Physical.    Orders:    CBC & Differential    Comprehensive Metabolic Panel

## 2025-02-01 DIAGNOSIS — E55.9 VITAMIN D DEFICIENCY: Primary | ICD-10-CM

## 2025-02-01 RX ORDER — ERGOCALCIFEROL 1.25 MG/1
1 CAPSULE ORAL WEEKLY
Qty: 15 CAPSULE | Refills: 1 | Status: SHIPPED | OUTPATIENT
Start: 2025-02-01

## 2025-02-17 ENCOUNTER — OFFICE VISIT (OUTPATIENT)
Age: 35
End: 2025-02-17
Payer: COMMERCIAL

## 2025-02-17 VITALS
OXYGEN SATURATION: 94 % | BODY MASS INDEX: 41.75 KG/M2 | DIASTOLIC BLOOD PRESSURE: 78 MMHG | WEIGHT: 315 LBS | HEIGHT: 73 IN | HEART RATE: 92 BPM | SYSTOLIC BLOOD PRESSURE: 122 MMHG

## 2025-02-17 DIAGNOSIS — F40.10 SOCIAL ANXIETY DISORDER: ICD-10-CM

## 2025-02-17 DIAGNOSIS — F41.0 SEVERE ANXIETY WITH PANIC: Primary | ICD-10-CM

## 2025-02-17 DIAGNOSIS — G47.00 INSOMNIA, UNSPECIFIED TYPE: ICD-10-CM

## 2025-02-17 DIAGNOSIS — F41.1 GENERALIZED ANXIETY DISORDER: ICD-10-CM

## 2025-02-17 RX ORDER — VENLAFAXINE 37.5 MG/1
TABLET ORAL
Qty: 60 TABLET | Refills: 2 | Status: SHIPPED | OUTPATIENT
Start: 2025-02-17

## 2025-02-17 NOTE — PROGRESS NOTES
"     Office  Follow Up Visit      Patient Name: Kenny Man Jr.  : 1990   MRN: [id number]     Referring Provider: Rosio Rondon APRN    Chief Complaint:  \" Management of severe anxiety with panic severe anxiety with panic, social anxiety, and insomnia\", social anxiety, and insomnia\"      ICD-10-CM ICD-9-CM   1. Severe anxiety with panic  F41.0 300.01   2. Insomnia, unspecified type  G47.00 780.52   3. Social anxiety disorder  F40.10 300.23   4. Generalized anxiety disorder  F41.1 300.02        History of Present Illness:   Kenny Man Jr. is a 34 y.o. male presenting for a routine follow up appointment for psychiatric medication management.        The patient is a 34-year-old male presenting today for follow-up of anxiety, insomnia, panic attacks, and social anxiety disorder.    He reports intermittent episodes of anxiety, which are not severe enough to persist for several days. He expresses a desire to increase his medication dosage, as he is currently on venlafaxine 37.5 mg, 1/2 tablet,  BID. He is taking tablets versus capsules due to vegan status. His sleep pattern is generally good, averaging 7 hours per night. He has not experienced any panic attacks since starting the medication. He also reports a positive mood state.      Subjective      Review of Systems:   Ears, Nose, Mouth, & Throat: Denies difficulty hearing, smelling, sinus problems, sore throat, or dentition.  Heart/Vascular: Denies rapid heart rate, chest pain, swelling of feet or legs   Respiratory: Denies shortness of breath, cough, or wheeze  GI/: Denies nausea, vomiting, constipation, diarrhea, abdominal pain, painful urination, frequent urination  MSK: Denies swelling or joint deformities  Skin: Denies lesions or rash  Neurologic: Denies headaches, dizziness, or tremor   Endocrine: Denies heat or cold intolerance   Hematologic: Denies easy bruising or bleeding  Psychiatric: Endorses anxiety; denies insomnia, " irritability, depression, recurrent bad thoughts, mood swings, hallucinations, compulsions    PHQ-9 Depression Screening  Little interest or pleasure in doing things? Not at all   Feeling down, depressed, or hopeless? Not at all   PHQ-2 Total Score 0   Trouble falling or staying asleep, or sleeping too much? Not at all   Feeling tired or having little energy? Not at all   Poor appetite or overeating? Not at all   Feeling bad about yourself - or that you are a failure or have let yourself or your family down? Not at all   Trouble concentrating on things, such as reading the newspaper or watching television? Not at all   Moving or speaking so slowly that other people could have noticed? Or the opposite - being so fidgety or restless that you have been moving around a lot more than usual? Not at all   Thoughts that you would be better off dead, or of hurting yourself in some way? Not at all   PHQ-9 Total Score 0   If you checked off any problems, how difficult have these problems made it for you to do your work, take care of things at home, or get along with other people? Not difficult at all         CORNELIA-7      Over the last two weeks, how often have you been bothered by the following problems?  Feeling nervous, anxious or on edge: Not at all  Not being able to stop or control worrying: Not at all  Worrying too much about different things: Not at all  Trouble Relaxing: Not at all  Being so restless that it is hard to sit still: Not at all  Becoming easily annoyed or irritable: Not at all  Feeling afraid as if something awful might happen: Not at all  CORNELIA 7 Total Score: 0  If you checked any problems, how difficult have these problems made it for you to do your work, take care of things at home, or get along with other people: Not difficult at all    Patient History:   The following portions of the patient's history were reviewed and updated as appropriate: allergies, current medications, past family history, past  medical history, past social history, past surgical history and problem list.     Social History     Socioeconomic History    Marital status: Single   Tobacco Use    Smoking status: Former     Current packs/day: 0.00     Average packs/day: 0.5 packs/day for 13.0 years (6.5 ttl pk-yrs)     Types: Cigarettes     Start date: 2009     Quit date: 2022     Years since quittin.6     Passive exposure: Past    Smokeless tobacco: Never    Tobacco comments:     I was a smoker. Never got above half a pack per day. I use tobacco free nicotine vapes.   Vaping Use    Vaping status: Every Day    Substances: Nicotine, Flavoring    Devices: Disposable   Substance and Sexual Activity    Alcohol use: Yes     Alcohol/week: 5.0 standard drinks of alcohol     Types: 5 Standard drinks or equivalent per week    Drug use: Yes     Comment: CBD GUMMIES    Sexual activity: Not Currently     Partners: Female     Birth control/protection: Condom       Family History   Problem Relation Age of Onset    Anxiety disorder Mother     Heart disease Father     Hyperlipidemia Father     Other (Hyperlipidemia) Father     Anxiety disorder Sister     Diabetes Sister     Hyperlipidemia Sister     ADD / ADHD Sister     No Known Problems Sister     Other (Autistic) Brother     Heart disease Paternal Grandfather          of heart disease at age 49       Medications:     Current Outpatient Medications:     venlafaxine (EFFEXOR) 37.5 MG tablet, Take 1 tablet two times per day., Disp: 60 tablet, Rfl: 2    lisinopril (PRINIVIL,ZESTRIL) 40 MG tablet, TAKE 1 TABLET BY MOUTH EVERY DAY, Disp: 90 tablet, Rfl: 0    pantoprazole (PROTONIX) 40 MG EC tablet, Take 1 tablet by mouth Daily., Disp: 90 tablet, Rfl: 3    rosuvastatin (CRESTOR) 40 MG tablet, TAKE 1 TABLET BY MOUTH EVERY DAY AT NIGHT, Disp: 90 tablet, Rfl: 0    Vitamin D, Ergocalciferol, 69801 units capsule, Take 1 capsule by mouth 1 (One) Time Per Week., Disp: 15 capsule, Rfl: 1    Objective  "    Physical Exam:  Vital Signs:   Vitals:    02/17/25 0824   BP: 122/78   Pulse: 92   SpO2: 94%   Weight: (!) 148 kg (326 lb)   Height: 184.4 cm (72.6\")     Body mass index is 43.49 kg/m².     Mental Status Exam:   The patient was alert and oriented to time, place, person, and situation. Neatly groomed and dressed. Good eye contact. Cooperative and answers questions willingly. No evidence of gait imbalance or shuffling. Normal speech rate, rhythm, and tone. Reports good mood. Affect congruent with mood. Denies SI/HI/AVH. Organized thought process. No evidence of delusional thinking. Good insight, good judgement. Impulse control intact. Memory intact with average to above average intellectual functioning.     Allergies   Allergen Reactions    Nickel Rash        Current Medications:   Current Outpatient Medications   Medication Sig Dispense Refill    venlafaxine (EFFEXOR) 37.5 MG tablet Take 1 tablet two times per day. 60 tablet 2    lisinopril (PRINIVIL,ZESTRIL) 40 MG tablet TAKE 1 TABLET BY MOUTH EVERY DAY 90 tablet 0    pantoprazole (PROTONIX) 40 MG EC tablet Take 1 tablet by mouth Daily. 90 tablet 3    rosuvastatin (CRESTOR) 40 MG tablet TAKE 1 TABLET BY MOUTH EVERY DAY AT NIGHT 90 tablet 0    Vitamin D, Ergocalciferol, 99660 units capsule Take 1 capsule by mouth 1 (One) Time Per Week. 15 capsule 1     No current facility-administered medications for this visit.       @RESULASTCBCDIFFPANEL,TSH,LABLIPI,ZABIXQDG07,OTSIIMAB19,MG,FOLATE,PROLACTIN,CRPRESULT,CMP,[id number])@    Lab Results   Component Value Date    GLUCOSE 104 (H) 01/31/2025    BUN 17 01/31/2025    CREATININE 0.85 01/31/2025    EGFR 115.5 01/31/2025    BCR 20.0 01/31/2025    K 4.8 01/31/2025    CO2 25.4 01/31/2025    CALCIUM 9.3 01/31/2025    ALBUMIN 4.4 01/31/2025    BILITOT 1.2 01/31/2025    AST 21 01/31/2025    ALT 18 01/31/2025       Lab Results   Component Value Date    WBC 5.21 01/31/2025    HGB 14.3 01/31/2025    HCT 41.1 01/31/2025    MCV 97.4 " (H) 01/31/2025     01/31/2025       Lab Results   Component Value Date    CHLPL 246 (H) 01/31/2025    TRIG 111 01/31/2025    HDL 59 01/31/2025     (H) 01/31/2025                  Assessment / Plan      Impression/Treatment Plan:     1. Anxiety.  He reports that his anxiety occasionally flares up but is generally well-managed with his current medication. He has not experienced any panic attacks since starting the medication. The dosage of his medication will be increased to 75 mg, with a whole 37.5 mg tablet to be taken in the morning and another in the evening. A prescription refill has been provided, and he is advised to contact the office if any issues arise before the next scheduled appointment.    Follow-up  The patient will follow up in 1 month.       Diagnoses and all orders for this visit:    1. Severe anxiety with panic (Primary)  -     venlafaxine (EFFEXOR) 37.5 MG tablet; Take 1 tablet two times per day.  Dispense: 60 tablet; Refill: 2    2. Insomnia, unspecified type  -     venlafaxine (EFFEXOR) 37.5 MG tablet; Take 1 tablet two times per day.  Dispense: 60 tablet; Refill: 2    3. Social anxiety disorder  -     venlafaxine (EFFEXOR) 37.5 MG tablet; Take 1 tablet two times per day.  Dispense: 60 tablet; Refill: 2    4. Generalized anxiety disorder  -     venlafaxine (EFFEXOR) 37.5 MG tablet; Take 1 tablet two times per day.  Dispense: 60 tablet; Refill: 2        MEDS ORDERED DURING VISIT:  New Medications Ordered This Visit   Medications    venlafaxine (EFFEXOR) 37.5 MG tablet     Sig: Take 1 tablet two times per day.     Dispense:  60 tablet     Refill:  2       Follow up with Jasmina Avelar, JANEEN, APRN, PMHNP-BC in this office in 1 month or sooner as needed.          PATIENT EDUCATION:  Discussed medication options and treatment plan of prescribed medication(s) as well as the risks, benefits, and potential side effects. Patient is agreeable to call the office with any worsening of  symptoms or onset of side effects. Patient is agreeable to call 911 or go to the nearest ER should he/she begin having SI/HI.    SAFETY PLAN:  Patient was given ample time for questions and fully participated in treatment planning.  Patient was encouraged to call the clinic with any questions or concerns.  Patient was informed of access to emergency care. If patient were to develop any significant symptomatology, suicidal ideation, homicidal ideation, any concerns, or feel unsafe at any time they are to call the clinic and if unable to get immediate assistance should immediately call 911 or go to the nearest emergency room.  The patient is advised to remove or secure (lock away) all lethal weapons (including guns) and sharps (including razors, scissors, knives, etc.).  All medications (including any prescribed and any over the counter medications) should be stored in a safe and secured location that is not obtainable by children/adolescents.  Patient was given an opportunity and encouraged to ask questions about their medication, illness, and treatment. Patient contracted verbally for the following: If you are experiencing an emotional crisis or have thoughts of harming yourself or others, please go to your nearest local emergency room or call 911. Will continue to re-assess medication response and side effects frequently to establish efficacy and ensure safety. Risks, any black box warnings, side effects, off label usage, and benefits of medication and treatment discussed with patient, along with potential adverse side effects of current and/or newly prescribed medication, alternative treatment options, and OTC medications.  Patient verbalized understanding of potential risks, any off label use of medication, any black box warnings, and any side effects in their own words. The patient verbalized understanding and agreed to comply with the safety plan discussed in their own words.  Patient given the number to the  office. Number also available to the 24- hour suicide hotline.     Short Term Goals: Continue building rapport with the patient. Patient will be compliant with medication, and patient will have no significant medication related side effects.  Patient will be engaged in psychotherapy as indicated.  Patient will report subjective improvement of symptoms.     Long term goals: To stabilize mood and treat/improve subjective symptoms, the patient will stay out of the hospital, the patient will be at an optimal level of functioning, and the patient will take all medications as prescribed.     Rodger reviewed and is appropriate.    Patient or patient representative verbalized consent for the use of Ambient Listening during the visit with  STEPH Martinez for chart documentation. 2/17/2025  08:48 EST       Copied text in this note has been reviewed and is accurate as of 2/17/2025.    Part of this note may be an electronic transcription/translation of spoken language to printed text using the Dragon Dictation System.    Jasmina Avelar DNP, STEHP, PMHNP-BC

## 2025-02-20 DIAGNOSIS — E78.2 MIXED HYPERLIPIDEMIA: ICD-10-CM

## 2025-02-21 RX ORDER — ROSUVASTATIN CALCIUM 40 MG/1
40 TABLET, COATED ORAL
Qty: 90 TABLET | Refills: 0 | Status: SHIPPED | OUTPATIENT
Start: 2025-02-21

## 2025-03-08 RX ORDER — TRAZODONE HYDROCHLORIDE 100 MG/1
TABLET ORAL
Qty: 90 TABLET | OUTPATIENT
Start: 2025-03-08

## 2025-03-21 ENCOUNTER — OFFICE VISIT (OUTPATIENT)
Age: 35
End: 2025-03-21
Payer: COMMERCIAL

## 2025-03-21 VITALS
SYSTOLIC BLOOD PRESSURE: 141 MMHG | OXYGEN SATURATION: 92 % | DIASTOLIC BLOOD PRESSURE: 87 MMHG | BODY MASS INDEX: 28.36 KG/M2 | HEIGHT: 73 IN | WEIGHT: 214 LBS | HEART RATE: 99 BPM

## 2025-03-21 DIAGNOSIS — F41.1 GENERALIZED ANXIETY DISORDER: ICD-10-CM

## 2025-03-21 DIAGNOSIS — F40.10 SOCIAL ANXIETY DISORDER: ICD-10-CM

## 2025-03-21 DIAGNOSIS — F41.0 SEVERE ANXIETY WITH PANIC: Primary | ICD-10-CM

## 2025-03-21 RX ORDER — HYDROXYZINE HYDROCHLORIDE 25 MG/1
25 TABLET, FILM COATED ORAL 3 TIMES DAILY PRN
Qty: 30 TABLET | Refills: 2 | Status: SHIPPED | OUTPATIENT
Start: 2025-03-21 | End: 2025-06-19

## 2025-03-21 NOTE — PROGRESS NOTES
"     Office  Follow Up Visit      Patient Name: Kenny Man Jr.  : 1990   MRN: [id number]     Referring Provider: Rosio Rondon APRN    Chief Complaint:  \"management of CORNELIA, social anxiety, and panic attacks\"      ICD-10-CM ICD-9-CM   1. Severe anxiety with panic  F41.0 300.01   2. Social anxiety disorder  F40.10 300.23   3. Generalized anxiety disorder  F41.1 300.02        History of Present Illness:   Kenny Man Jr. is a 34 y.o. male presenting for a routine follow up appointment for psychiatric medication management.        He reports feeling well overall, with the exception of mild fatigue due to insufficient sleep the previous night. He attributes this to his recent acceptance into a  training program at his workplace, which necessitated a change in his work schedule. He reports no current issues with anxiety or panic attacks. He has experienced a weight loss of approximately 40 pounds over the past 6 months, which he attributes to his physically demanding job that keeps him on his feet throughout the day. He anticipates a similar, if not more strenuous, physical activity level in his new role as a . He expresses satisfaction with his current medication regimen, which includes hydroxyzine 25 mg taken twice daily and venlafaxine IR 37.5 mg (IR due to patient's vegan status vs XR capsule) also administered twice daily.    MEDICATIONS  Current: Hydroxyzine, venlafaxine.     Subjective      Review of Systems:   Ears, Nose, Mouth, & Throat: Denies difficulty hearing, smelling, sinus problems, sore throat, or dentition.  Heart/Vascular: Denies rapid heart rate, chest pain, swelling of feet or legs   Respiratory: Denies shortness of breath, cough, or wheeze  GI/: Denies nausea, vomiting, constipation, diarrhea, abdominal pain, painful urination, frequent urination  MSK: Denies swelling or joint deformities  Skin: Denies lesions or rash  Neurologic: Denies " headaches, dizziness, or tremor   Endocrine: Denies heat or cold intolerance   Hematologic: Denies easy bruising or bleeding  Psychiatric: Denies insomnia, irritability, depression, anxiety, recurrent bad thoughts, mood swings, hallucinations, compulsions    PHQ-9 Depression Screening  Little interest or pleasure in doing things? Not at all   Feeling down, depressed, or hopeless? Not at all   PHQ-2 Total Score 0   Trouble falling or staying asleep, or sleeping too much? Not at all   Feeling tired or having little energy? Not at all   Poor appetite or overeating? Not at all   Feeling bad about yourself - or that you are a failure or have let yourself or your family down? Not at all   Trouble concentrating on things, such as reading the newspaper or watching television? Not at all   Moving or speaking so slowly that other people could have noticed? Or the opposite - being so fidgety or restless that you have been moving around a lot more than usual? Not at all     Thoughts that you would be better off dead, or of hurting yourself in some way? Not at all   PHQ-9 Total Score 0   If you checked off any problems, how difficult have these problems made it for you to do your work, take care of things at home, or get along with other people? Not difficult at all         CORNELIA-7      Over the last two weeks, how often have you been bothered by the following problems?  Feeling nervous, anxious or on edge: Not at all  Not being able to stop or control worrying: Not at all  Worrying too much about different things: Not at all  Trouble Relaxing: Not at all  Being so restless that it is hard to sit still: Not at all  Becoming easily annoyed or irritable: Not at all  Feeling afraid as if something awful might happen: Not at all  CORNELIA 7 Total Score: 0  If you checked any problems, how difficult have these problems made it for you to do your work, take care of things at home, or get along with other people: Not difficult at  all    Patient History:   The following portions of the patient's history were reviewed and updated as appropriate: allergies, current medications, past family history, past medical history, past social history, past surgical history and problem list.     Social History     Socioeconomic History    Marital status: Single   Tobacco Use    Smoking status: Former     Current packs/day: 0.00     Average packs/day: 0.5 packs/day for 13.0 years (6.5 ttl pk-yrs)     Types: Cigarettes     Start date: 2009     Quit date: 2022     Years since quittin.7     Passive exposure: Past    Smokeless tobacco: Never    Tobacco comments:     I was a smoker. Never got above half a pack per day. I use tobacco free nicotine vapes.   Vaping Use    Vaping status: Every Day    Substances: Nicotine, Flavoring    Devices: Disposable   Substance and Sexual Activity    Alcohol use: Yes     Alcohol/week: 5.0 standard drinks of alcohol     Types: 5 Standard drinks or equivalent per week    Drug use: Yes     Comment: CBD GUMMIES    Sexual activity: Not Currently     Partners: Female     Birth control/protection: Condom       Family History   Problem Relation Age of Onset    Anxiety disorder Mother     Heart disease Father     Hyperlipidemia Father     Other (Hyperlipidemia) Father     Anxiety disorder Sister     Diabetes Sister     Hyperlipidemia Sister     ADD / ADHD Sister     No Known Problems Sister     Other (Autistic) Brother     Heart disease Paternal Grandfather          of heart disease at age 49       Medications:     Current Outpatient Medications:     hydrOXYzine (ATARAX) 25 MG tablet, Take 1 tablet by mouth 3 (Three) Times a Day As Needed for Itching for up to 90 days., Disp: 30 tablet, Rfl: 2    lisinopril (PRINIVIL,ZESTRIL) 40 MG tablet, TAKE 1 TABLET BY MOUTH EVERY DAY, Disp: 90 tablet, Rfl: 0    pantoprazole (PROTONIX) 40 MG EC tablet, Take 1 tablet by mouth Daily., Disp: 90 tablet, Rfl: 3    rosuvastatin (CRESTOR)  "40 MG tablet, Take 1 tablet by mouth every night at bedtime., Disp: 90 tablet, Rfl: 0    venlafaxine (EFFEXOR) 37.5 MG tablet, Take 1 tablet two times per day., Disp: 60 tablet, Rfl: 2    Vitamin D, Ergocalciferol, 76727 units capsule, Take 1 capsule by mouth 1 (One) Time Per Week., Disp: 15 capsule, Rfl: 1    Objective     Physical Exam:  Vital Signs:   Vitals:    03/21/25 1505   BP: 141/87   Pulse: 99   SpO2: 92%   Weight: 97.1 kg (214 lb)   Height: 184.4 cm (72.6\")     Body mass index is 28.55 kg/m².     Mental Status Exam:   The patient was alert and oriented to time, place, person, and situation. Neatly groomed and dressed. Good eye contact. Cooperative and answers questions willingly. No evidence of gait imbalance or shuffling. Normal speech rate, rhythm, and tone. Reports good mood. Affect congruent with mood. Denies SI/HI/AVH. Organized thought process. No evidence of delusional thinking. Good insight, good judgement. Impulse control intact. Memory intact with average to above average intellectual functioning.     Allergies   Allergen Reactions    Nickel Rash     Current Medications:   Current Outpatient Medications   Medication Sig Dispense Refill    hydrOXYzine (ATARAX) 25 MG tablet Take 1 tablet by mouth 3 (Three) Times a Day As Needed for Itching for up to 90 days. 30 tablet 2    lisinopril (PRINIVIL,ZESTRIL) 40 MG tablet TAKE 1 TABLET BY MOUTH EVERY DAY 90 tablet 0    pantoprazole (PROTONIX) 40 MG EC tablet Take 1 tablet by mouth Daily. 90 tablet 3    rosuvastatin (CRESTOR) 40 MG tablet Take 1 tablet by mouth every night at bedtime. 90 tablet 0    venlafaxine (EFFEXOR) 37.5 MG tablet Take 1 tablet two times per day. 60 tablet 2    Vitamin D, Ergocalciferol, 27838 units capsule Take 1 capsule by mouth 1 (One) Time Per Week. 15 capsule 1     No current facility-administered medications for this visit. "       @RESULASTCBCDIFFPANEL,TSH,LABLIPI,[id number],EXLUPGKO43,MG,FOLATE,PROLACTIN,CRPRESULT,CMP,[id number])@    Lab Results   Component Value Date    GLUCOSE 104 (H) 01/31/2025    BUN 17 01/31/2025    CREATININE 0.85 01/31/2025    EGFR 115.5 01/31/2025    BCR 20.0 01/31/2025    K 4.8 01/31/2025    CO2 25.4 01/31/2025    CALCIUM 9.3 01/31/2025    ALBUMIN 4.4 01/31/2025    BILITOT 1.2 01/31/2025    AST 21 01/31/2025    ALT 18 01/31/2025       Lab Results   Component Value Date    WBC 5.21 01/31/2025    HGB 14.3 01/31/2025    HCT 41.1 01/31/2025    MCV 97.4 (H) 01/31/2025     01/31/2025       Lab Results   Component Value Date    CHLPL 246 (H) 01/31/2025    TRIG 111 01/31/2025    HDL 59 01/31/2025     (H) 01/31/2025                  Assessment / Plan      Impression/Treatment Plan:       1. Anxiety.  He reports that his anxiety and panic attacks are well-managed with his current medication regimen. He is currently taking venlafaxine IR 37.5 mg twice a day, which he confirms is effective. He also takes hydroxyzine 25 mg in the morning and at night before bed, which he finds helpful. He is advised to continue with the current dosages of both medications. If he needs a refill before his next appointment, he should call the office.       Diagnoses and all orders for this visit:    1. Severe anxiety with panic (Primary)  -     hydrOXYzine (ATARAX) 25 MG tablet; Take 1 tablet by mouth 3 (Three) Times a Day As Needed for Itching for up to 90 days.  Dispense: 30 tablet; Refill: 2    2. Social anxiety disorder  -     hydrOXYzine (ATARAX) 25 MG tablet; Take 1 tablet by mouth 3 (Three) Times a Day As Needed for Itching for up to 90 days.  Dispense: 30 tablet; Refill: 2    3. Generalized anxiety disorder  -     hydrOXYzine (ATARAX) 25 MG tablet; Take 1 tablet by mouth 3 (Three) Times a Day As Needed for Itching for up to 90 days.  Dispense: 30 tablet; Refill: 2        MEDS ORDERED DURING VISIT:  New Medications  Ordered This Visit   Medications    hydrOXYzine (ATARAX) 25 MG tablet     Sig: Take 1 tablet by mouth 3 (Three) Times a Day As Needed for Itching for up to 90 days.     Dispense:  30 tablet     Refill:  2       Follow up in this office in 3 months or sooner as needed.      PATIENT EDUCATION:  Discussed medication options and treatment plan of prescribed medication(s) as well as the risks, benefits, and potential side effects. Patient is agreeable to call the office with any worsening of symptoms or onset of side effects. Patient is agreeable to call 911 or go to the nearest ER should he/she begin having SI/HI.    SAFETY PLAN:  Patient was given ample time for questions and fully participated in treatment planning.  Patient was encouraged to call the clinic with any questions or concerns.  Patient was informed of access to emergency care. If patient were to develop any significant symptomatology, suicidal ideation, homicidal ideation, any concerns, or feel unsafe at any time they are to call the clinic and if unable to get immediate assistance should immediately call 911 or go to the nearest emergency room.  The patient is advised to remove or secure (lock away) all lethal weapons (including guns) and sharps (including razors, scissors, knives, etc.).  All medications (including any prescribed and any over the counter medications) should be stored in a safe and secured location that is not obtainable by children/adolescents.  Patient was given an opportunity and encouraged to ask questions about their medication, illness, and treatment. Patient contracted verbally for the following: If you are experiencing an emotional crisis or have thoughts of harming yourself or others, please go to your nearest local emergency room or call 911. Will continue to re-assess medication response and side effects frequently to establish efficacy and ensure safety. Risks, any black box warnings, side effects, off label usage, and  benefits of medication and treatment discussed with patient, along with potential adverse side effects of current and/or newly prescribed medication, alternative treatment options, and OTC medications.  Patient verbalized understanding of potential risks, any off label use of medication, any black box warnings, and any side effects in their own words. The patient verbalized understanding and agreed to comply with the safety plan discussed in their own words.  Patient given the number to the office. Number also available to the 24- hour suicide hotline.     Short Term Goals: Continue building rapport with the patient. Patient will be compliant with medication, and patient will have no significant medication related side effects.  Patient will be engaged in psychotherapy as indicated.  Patient will report subjective improvement of symptoms.     Long term goals: To stabilize mood and treat/improve subjective symptoms, the patient will stay out of the hospital, the patient will be at an optimal level of functioning, and the patient will take all medications as prescribed.     Rodger reviewed and is appropriate.    Patient or patient representative verbalized consent for the use of Ambient Listening during the visit with  STEPH Martinez for chart documentation. 3/21/2025  15:16 EDT       Copied text in this note has been reviewed and is accurate as of 3/21/2025.    Part of this note may be an electronic transcription/translation of spoken language to printed text using the Dragon Dictation System.    Jasmina Avelar DNP, STEPH, PMHNP-BC

## 2025-04-07 DIAGNOSIS — F41.0 SEVERE ANXIETY WITH PANIC: ICD-10-CM

## 2025-04-07 DIAGNOSIS — F40.10 SOCIAL ANXIETY DISORDER: ICD-10-CM

## 2025-04-07 DIAGNOSIS — F41.1 GENERALIZED ANXIETY DISORDER: ICD-10-CM

## 2025-04-07 RX ORDER — HYDROXYZINE HYDROCHLORIDE 25 MG/1
25 TABLET, FILM COATED ORAL 3 TIMES DAILY PRN
Qty: 30 TABLET | Refills: 2 | Status: SHIPPED | OUTPATIENT
Start: 2025-04-07 | End: 2025-07-06

## 2025-04-07 NOTE — TELEPHONE ENCOUNTER
Rx Refill Note  Requested Prescriptions     Pending Prescriptions Disp Refills    hydrOXYzine (ATARAX) 25 MG tablet 30 tablet 2     Sig: Take 1 tablet by mouth 3 (Three) Times a Day As Needed for Itching for up to 90 days.      Last office visit with prescribing clinician: 3/21/2025   Last telemedicine visit with prescribing clinician: Visit date not found   Next office visit with prescribing clinician: Visit date not found     Jade Brock MA  04/07/25, 10:31 EDT

## 2025-04-11 DIAGNOSIS — F41.0 SEVERE ANXIETY WITH PANIC: ICD-10-CM

## 2025-04-11 DIAGNOSIS — F40.10 SOCIAL ANXIETY DISORDER: ICD-10-CM

## 2025-04-11 DIAGNOSIS — F41.1 GENERALIZED ANXIETY DISORDER: ICD-10-CM

## 2025-04-11 RX ORDER — HYDROXYZINE HYDROCHLORIDE 25 MG/1
25 TABLET, FILM COATED ORAL 3 TIMES DAILY PRN
Qty: 30 TABLET | Refills: 2 | Status: CANCELLED | OUTPATIENT
Start: 2025-04-11 | End: 2025-07-10

## 2025-04-11 NOTE — TELEPHONE ENCOUNTER
Rx Refill Note  Requested Prescriptions     Pending Prescriptions Disp Refills    hydrOXYzine (ATARAX) 25 MG tablet 30 tablet 2     Sig: Take 1 tablet by mouth 3 (Three) Times a Day As Needed for Anxiety for up to 90 days.      Last office visit with prescribing clinician: 3/21/2025   Last telemedicine visit with prescribing clinician: Visit date not found   Next office visit with prescribing clinician: Visit date not found     Waleska Hemphill MA  04/11/25, 08:32 EDT

## 2025-05-06 DIAGNOSIS — F41.0 SEVERE ANXIETY WITH PANIC: ICD-10-CM

## 2025-05-06 DIAGNOSIS — I10 ESSENTIAL HYPERTENSION: ICD-10-CM

## 2025-05-06 DIAGNOSIS — F41.1 GENERALIZED ANXIETY DISORDER: ICD-10-CM

## 2025-05-06 DIAGNOSIS — G47.00 INSOMNIA, UNSPECIFIED TYPE: ICD-10-CM

## 2025-05-06 DIAGNOSIS — F40.10 SOCIAL ANXIETY DISORDER: ICD-10-CM

## 2025-05-07 RX ORDER — VENLAFAXINE 37.5 MG/1
TABLET ORAL
Qty: 60 TABLET | Refills: 2 | Status: SHIPPED | OUTPATIENT
Start: 2025-05-07 | End: 2025-05-11 | Stop reason: SDUPTHER

## 2025-05-07 RX ORDER — LISINOPRIL 40 MG/1
40 TABLET ORAL DAILY
Qty: 90 TABLET | Refills: 0 | Status: SHIPPED | OUTPATIENT
Start: 2025-05-07

## 2025-05-11 DIAGNOSIS — G47.00 INSOMNIA, UNSPECIFIED TYPE: ICD-10-CM

## 2025-05-11 DIAGNOSIS — F41.0 SEVERE ANXIETY WITH PANIC: ICD-10-CM

## 2025-05-11 DIAGNOSIS — F40.10 SOCIAL ANXIETY DISORDER: ICD-10-CM

## 2025-05-11 DIAGNOSIS — F41.1 GENERALIZED ANXIETY DISORDER: ICD-10-CM

## 2025-05-12 RX ORDER — VENLAFAXINE 37.5 MG/1
TABLET ORAL
Qty: 60 TABLET | Refills: 2 | Status: SHIPPED | OUTPATIENT
Start: 2025-05-12

## 2025-05-19 DIAGNOSIS — E78.2 MIXED HYPERLIPIDEMIA: ICD-10-CM

## 2025-05-19 RX ORDER — ROSUVASTATIN CALCIUM 40 MG/1
40 TABLET, COATED ORAL
Qty: 90 TABLET | Refills: 0 | Status: SHIPPED | OUTPATIENT
Start: 2025-05-19

## 2025-07-18 DIAGNOSIS — E55.9 VITAMIN D DEFICIENCY: ICD-10-CM

## 2025-07-18 RX ORDER — ERGOCALCIFEROL 1.25 MG/1
50000 CAPSULE, LIQUID FILLED ORAL WEEKLY
Qty: 12 CAPSULE | Refills: 2 | Status: SHIPPED | OUTPATIENT
Start: 2025-07-18

## 2025-08-01 ENCOUNTER — OFFICE VISIT (OUTPATIENT)
Dept: FAMILY MEDICINE CLINIC | Facility: CLINIC | Age: 35
End: 2025-08-01
Payer: COMMERCIAL

## 2025-08-01 VITALS
WEIGHT: 313.6 LBS | TEMPERATURE: 97.9 F | OXYGEN SATURATION: 98 % | HEIGHT: 73 IN | BODY MASS INDEX: 41.56 KG/M2 | DIASTOLIC BLOOD PRESSURE: 60 MMHG | HEART RATE: 102 BPM | SYSTOLIC BLOOD PRESSURE: 130 MMHG

## 2025-08-01 DIAGNOSIS — E55.9 VITAMIN D DEFICIENCY: Chronic | ICD-10-CM

## 2025-08-01 DIAGNOSIS — I10 ESSENTIAL HYPERTENSION: Primary | Chronic | ICD-10-CM

## 2025-08-01 DIAGNOSIS — E78.2 MIXED HYPERLIPIDEMIA: Chronic | ICD-10-CM

## 2025-08-01 PROCEDURE — 99214 OFFICE O/P EST MOD 30 MIN: CPT | Performed by: NURSE PRACTITIONER

## 2025-08-01 RX ORDER — HYDROXYZINE HYDROCHLORIDE 25 MG/1
25 TABLET, FILM COATED ORAL 3 TIMES DAILY PRN
COMMUNITY

## 2025-08-04 DIAGNOSIS — E78.2 MIXED HYPERLIPIDEMIA: ICD-10-CM

## 2025-08-04 DIAGNOSIS — E55.9 VITAMIN D DEFICIENCY: ICD-10-CM

## 2025-08-04 DIAGNOSIS — I10 ESSENTIAL HYPERTENSION: ICD-10-CM

## 2025-08-05 LAB
25(OH)D3+25(OH)D2 SERPL-MCNC: 28 NG/ML (ref 30–100)
ALBUMIN SERPL-MCNC: 4.5 G/DL (ref 3.5–5.2)
ALBUMIN/GLOB SERPL: 1.8 G/DL
ALP SERPL-CCNC: 55 U/L (ref 39–117)
ALT SERPL-CCNC: 20 U/L (ref 1–41)
AST SERPL-CCNC: 22 U/L (ref 1–40)
BASOPHILS # BLD AUTO: 0.04 10*3/MM3 (ref 0–0.2)
BASOPHILS NFR BLD AUTO: 0.6 % (ref 0–1.5)
BILIRUB SERPL-MCNC: 0.5 MG/DL (ref 0–1.2)
BUN SERPL-MCNC: 19 MG/DL (ref 6–20)
BUN/CREAT SERPL: 20.2 (ref 7–25)
CALCIUM SERPL-MCNC: 9.4 MG/DL (ref 8.6–10.5)
CHLORIDE SERPL-SCNC: 101 MMOL/L (ref 98–107)
CHOLEST SERPL-MCNC: 214 MG/DL (ref 0–200)
CO2 SERPL-SCNC: 27.4 MMOL/L (ref 22–29)
CREAT SERPL-MCNC: 0.94 MG/DL (ref 0.76–1.27)
EGFRCR SERPLBLD CKD-EPI 2021: 107.7 ML/MIN/1.73
EOSINOPHIL # BLD AUTO: 0.19 10*3/MM3 (ref 0–0.4)
EOSINOPHIL NFR BLD AUTO: 3 % (ref 0.3–6.2)
ERYTHROCYTE [DISTWIDTH] IN BLOOD BY AUTOMATED COUNT: 11.7 % (ref 12.3–15.4)
GLOBULIN SER CALC-MCNC: 2.5 GM/DL
GLUCOSE SERPL-MCNC: 94 MG/DL (ref 65–99)
HCT VFR BLD AUTO: 40.7 % (ref 37.5–51)
HDLC SERPL-MCNC: 46 MG/DL (ref 40–60)
HGB BLD-MCNC: 14.2 G/DL (ref 13–17.7)
IMM GRANULOCYTES # BLD AUTO: 0.01 10*3/MM3 (ref 0–0.05)
IMM GRANULOCYTES NFR BLD AUTO: 0.2 % (ref 0–0.5)
LDLC SERPL CALC-MCNC: 159 MG/DL (ref 0–100)
LYMPHOCYTES # BLD AUTO: 1.61 10*3/MM3 (ref 0.7–3.1)
LYMPHOCYTES NFR BLD AUTO: 25.4 % (ref 19.6–45.3)
MCH RBC QN AUTO: 34.1 PG (ref 26.6–33)
MCHC RBC AUTO-ENTMCNC: 34.9 G/DL (ref 31.5–35.7)
MCV RBC AUTO: 97.8 FL (ref 79–97)
MONOCYTES # BLD AUTO: 0.63 10*3/MM3 (ref 0.1–0.9)
MONOCYTES NFR BLD AUTO: 9.9 % (ref 5–12)
NEUTROPHILS # BLD AUTO: 3.86 10*3/MM3 (ref 1.7–7)
NEUTROPHILS NFR BLD AUTO: 60.9 % (ref 42.7–76)
NRBC BLD AUTO-RTO: 0 /100 WBC (ref 0–0.2)
PLATELET # BLD AUTO: 221 10*3/MM3 (ref 140–450)
POTASSIUM SERPL-SCNC: 5.4 MMOL/L (ref 3.5–5.2)
PROT SERPL-MCNC: 7 G/DL (ref 6–8.5)
RBC # BLD AUTO: 4.16 10*6/MM3 (ref 4.14–5.8)
SODIUM SERPL-SCNC: 137 MMOL/L (ref 136–145)
TRIGL SERPL-MCNC: 52 MG/DL (ref 0–150)
VLDLC SERPL CALC-MCNC: 9 MG/DL (ref 5–40)
WBC # BLD AUTO: 6.34 10*3/MM3 (ref 3.4–10.8)

## 2025-08-08 DIAGNOSIS — I10 ESSENTIAL HYPERTENSION: ICD-10-CM

## 2025-08-08 RX ORDER — LISINOPRIL 40 MG/1
40 TABLET ORAL DAILY
Qty: 90 TABLET | Refills: 1 | Status: SHIPPED | OUTPATIENT
Start: 2025-08-08